# Patient Record
Sex: FEMALE | Race: WHITE | NOT HISPANIC OR LATINO | Employment: UNEMPLOYED | URBAN - METROPOLITAN AREA
[De-identification: names, ages, dates, MRNs, and addresses within clinical notes are randomized per-mention and may not be internally consistent; named-entity substitution may affect disease eponyms.]

---

## 2022-11-01 ENCOUNTER — TELEPHONE (OUTPATIENT)
Dept: GASTROENTEROLOGY | Facility: CLINIC | Age: 52
End: 2022-11-01
Payer: MEDICAID

## 2022-11-01 NOTE — TELEPHONE ENCOUNTER
Called pt, -[phone]   (x3)   no ans  Recording stated number I have dialed has been disconnected or no longer in service      Called - [phone]    recording stated number that is dialed is not a working no.    Pt does not have portal set up

## 2022-11-25 ENCOUNTER — TELEPHONE (OUTPATIENT)
Dept: GASTROENTEROLOGY | Facility: CLINIC | Age: 52
End: 2022-11-25
Payer: MEDICAID

## 2022-11-25 NOTE — TELEPHONE ENCOUNTER
Called pt, - [phone] recording states number you have dialed in no longer in service.  Called (X2) - [phone], recording states voice mail box not set up.

## 2022-12-23 ENCOUNTER — TELEPHONE (OUTPATIENT)
Dept: GASTROENTEROLOGY | Facility: CLINIC | Age: 52
End: 2022-12-23
Payer: MEDICAID

## 2022-12-23 NOTE — TELEPHONE ENCOUNTER
----- Message from Sheryl Caldwell sent at 12/23/2022 11:27 AM CST -----  Contact: Pt live chat  .Type:  Sooner Appointment Request    Caller is requesting a sooner appointment.  Caller declined first available appointment listed below.  Caller will not accept being placed on the waitlist and is requesting a message be sent to doctor.  Name of Caller:Barbara  When is the first available appointment?  Symptoms:referral in   Would the patient rather a call back or a response via [facility]? callback  Best Call Back Number:.[phone] (home)     Additional Information:

## 2023-01-03 ENCOUNTER — TELEPHONE (OUTPATIENT)
Dept: GASTROENTEROLOGY | Facility: CLINIC | Age: 53
End: 2023-01-03
Payer: MEDICAID

## 2023-01-03 NOTE — TELEPHONE ENCOUNTER
----- Message from Gustavo Arzola sent at 1/3/2023  3:17 PM CST -----  Contact: patient  Type:  Patient Call          Who Called: patient         Does the patient know what this is regarding?: Requesting a call back to schedule appt ; please advise           Would the patient rather a call back or a response via [facility]? Call           Best Call Back Number: [phone]             Additional Information: Pt does have a referral

## 2023-01-03 NOTE — TELEPHONE ENCOUNTER
Called and spoke with the patient, patient wanted to know if her insurance was taken here at the Gi clinic, insurance not accepted at this time for new patients per appointment schedule, while attempting to assist the patient, call was disconnected by the patient.                    ----- Message from Gustavo Arzola sent at 1/3/2023  3:17 PM CST -----  Contact: patient  Type:  Patient Call          Who Called: patient         Does the patient know what this is regarding?: Requesting a call back to schedule appt ; please advise           Would the patient rather a call back or a response via [facility]? Call           Best Call Back Number: [phone]             Additional Information: Pt does have a referral

## 2023-01-04 ENCOUNTER — PATIENT MESSAGE (OUTPATIENT)
Dept: GASTROENTEROLOGY | Facility: CLINIC | Age: 53
End: 2023-01-04
Payer: MEDICAID

## 2023-01-04 ENCOUNTER — TELEPHONE (OUTPATIENT)
Dept: GASTROENTEROLOGY | Facility: CLINIC | Age: 53
End: 2023-01-04
Payer: MEDICAID

## 2023-01-04 NOTE — TELEPHONE ENCOUNTER
Discussed with pt that Dr. Gonzales is a consultant that does not accept patients as a primary GI provider.  Discussed that she will send them back to their primary GI provider once their symptoms improved or the plan of care is established.     Pt was told the logistics of the appointment (arrival time, length of visit, total time spent at facility).     Pt was also told that Dr. Gonzales will review their previous workup but may order additional test and perform her own procedures.  Patient agreed and verbalized understanding.      Asked pt to call tech support to see why she is unable to use her [facility].   Ph no provided    Directions to [place] sent to pt portal    VALERY faxed to Dr Salazar requesting   ([phone])  successful @ 10:08a  Surgical report 4/2022  GES report 2022

## 2023-01-12 ENCOUNTER — TELEPHONE (OUTPATIENT)
Dept: GASTROENTEROLOGY | Facility: CLINIC | Age: 53
End: 2023-01-12
Payer: MEDICAID

## 2023-01-12 NOTE — TELEPHONE ENCOUNTER
Received medical records from Dr Salazar:  Gastrectomy surgical report  4/5/22  GES 2/24/22  OV 11/30/22 and 8/24/22

## 2023-01-16 ENCOUNTER — PATIENT MESSAGE (OUTPATIENT)
Dept: GASTROENTEROLOGY | Facility: CLINIC | Age: 53
End: 2023-01-16
Payer: MEDICAID

## 2023-01-18 ENCOUNTER — TELEPHONE (OUTPATIENT)
Dept: GASTROENTEROLOGY | Facility: CLINIC | Age: 53
End: 2023-01-18
Payer: MEDICAID

## 2023-01-18 NOTE — TELEPHONE ENCOUNTER
----- Message from Aakash Lozano MA sent at 1/18/2023 10:11 AM CST -----  Regarding: FW: requesting call back  Contact: self  [phone]    ----- Message -----  From: Sulma Chan  Sent: 1/18/2023   8:57 AM CST  To: [facility] Clinical Staff  Subject: requesting call back                             Pt requesting a appointment pt requesting sting a call back from Lona as she has been communicating on the portal with pt states a referral has been sent I didn't see a referral in the system.

## 2023-01-19 ENCOUNTER — PATIENT MESSAGE (OUTPATIENT)
Dept: GASTROENTEROLOGY | Facility: CLINIC | Age: 53
End: 2023-01-19
Payer: MEDICAID

## 2023-02-03 RX ORDER — RIZATRIPTAN BENZOATE 5 MG/1
TABLET ORAL
COMMUNITY
Start: 2022-12-26

## 2023-02-03 RX ORDER — DICYCLOMINE HYDROCHLORIDE 10 MG/1
10 CAPSULE ORAL
COMMUNITY
Start: 2022-12-31

## 2023-02-03 RX ORDER — PRUCALOPRIDE 2 MG/1
1 TABLET, FILM COATED ORAL
COMMUNITY
Start: 2023-01-17

## 2023-02-03 RX ORDER — PLECANATIDE 3 MG/1
1 TABLET ORAL
COMMUNITY
Start: 2023-01-17

## 2023-02-03 RX ORDER — PANTOPRAZOLE SODIUM 40 MG/1
40 TABLET, DELAYED RELEASE ORAL
COMMUNITY
Start: 2023-01-09

## 2023-02-03 RX ORDER — CYCLOBENZAPRINE HCL 10 MG
10 TABLET ORAL 2 TIMES DAILY PRN
COMMUNITY
Start: 2023-01-06

## 2023-02-03 RX ORDER — ESOMEPRAZOLE MAGNESIUM 40 MG/1
40 CAPSULE, DELAYED RELEASE ORAL EVERY MORNING
COMMUNITY
Start: 2023-01-11

## 2023-02-03 RX ORDER — LEVOCETIRIZINE DIHYDROCHLORIDE 5 MG/1
5 TABLET, FILM COATED ORAL
COMMUNITY
Start: 2022-12-31

## 2023-02-03 RX ORDER — METOCLOPRAMIDE HYDROCHLORIDE 15 MG/.07ML
1 SPRAY NASAL 4 TIMES DAILY
COMMUNITY
Start: 2023-01-06

## 2023-02-03 RX ORDER — ONDANSETRON 8 MG/1
8 TABLET, ORALLY DISINTEGRATING ORAL DAILY PRN
COMMUNITY
Start: 2023-01-13

## 2023-02-03 RX ORDER — AMLODIPINE BESYLATE 10 MG/1
10 TABLET ORAL
COMMUNITY
Start: 2023-01-17

## 2023-02-03 RX ORDER — ATORVASTATIN CALCIUM 10 MG/1
10 TABLET, FILM COATED ORAL
COMMUNITY
Start: 2023-01-13

## 2023-02-03 RX ORDER — LINACLOTIDE 290 UG/1
290 CAPSULE, GELATIN COATED ORAL EVERY MORNING
COMMUNITY
Start: 2022-10-31

## 2023-02-03 RX ORDER — OXYBUTYNIN CHLORIDE 10 MG/1
10 TABLET, EXTENDED RELEASE ORAL
COMMUNITY
Start: 2022-12-13

## 2023-02-03 RX ORDER — PROMETHAZINE HYDROCHLORIDE 25 MG/1
25 TABLET ORAL EVERY 12 HOURS
COMMUNITY
Start: 2023-01-09

## 2023-02-03 RX ORDER — FERROUS SULFATE TAB 325 MG (65 MG ELEMENTAL FE) 325 (65 FE) MG
TAB ORAL
COMMUNITY
Start: 2022-12-08

## 2023-02-06 ENCOUNTER — TELEPHONE (OUTPATIENT)
Dept: GASTROENTEROLOGY | Facility: CLINIC | Age: 53
End: 2023-02-06

## 2023-02-06 ENCOUNTER — PATIENT MESSAGE (OUTPATIENT)
Dept: GASTROENTEROLOGY | Facility: CLINIC | Age: 53
End: 2023-02-06

## 2023-02-06 ENCOUNTER — OFFICE VISIT (OUTPATIENT)
Dept: GASTROENTEROLOGY | Facility: CLINIC | Age: 53
End: 2023-02-06
Payer: MEDICAID

## 2023-02-06 ENCOUNTER — TELEPHONE (OUTPATIENT)
Dept: GASTROENTEROLOGY | Facility: CLINIC | Age: 53
End: 2023-02-06
Payer: MEDICAID

## 2023-02-06 DIAGNOSIS — R13.10 DYSPHAGIA, UNSPECIFIED TYPE: Primary | ICD-10-CM

## 2023-02-06 DIAGNOSIS — K21.9 GASTROESOPHAGEAL REFLUX DISEASE, UNSPECIFIED WHETHER ESOPHAGITIS PRESENT: ICD-10-CM

## 2023-02-06 PROCEDURE — 1160F RVW MEDS BY RX/DR IN RCRD: CPT | Mod: CPTII,95,, | Performed by: INTERNAL MEDICINE

## 2023-02-06 PROCEDURE — 99205 OFFICE O/P NEW HI 60 MIN: CPT | Mod: 95,,, | Performed by: INTERNAL MEDICINE

## 2023-02-06 PROCEDURE — 1160F PR REVIEW ALL MEDS BY PRESCRIBER/CLIN PHARMACIST DOCUMENTED: ICD-10-PCS | Mod: CPTII,95,, | Performed by: INTERNAL MEDICINE

## 2023-02-06 PROCEDURE — 99205 PR OFFICE/OUTPT VISIT, NEW, LEVL V, 60-74 MIN: ICD-10-PCS | Mod: 95,,, | Performed by: INTERNAL MEDICINE

## 2023-02-06 PROCEDURE — 1159F PR MEDICATION LIST DOCUMENTED IN MEDICAL RECORD: ICD-10-PCS | Mod: CPTII,95,, | Performed by: INTERNAL MEDICINE

## 2023-02-06 PROCEDURE — 1159F MED LIST DOCD IN RCRD: CPT | Mod: CPTII,95,, | Performed by: INTERNAL MEDICINE

## 2023-02-06 NOTE — TELEPHONE ENCOUNTER
Called pt, no ans  Lmvm re: need to review AVS instructions  Explained will try to reach out with pt either later today or tomorrow.  Clinic ph no provided

## 2023-02-06 NOTE — PATIENT INSTRUCTIONS
-Complete EGD with EndoFlip    EGD is an endoscopic procedure that allows your doctor to examine your esophagus, stomach and duodenum (part of your small intestine). EGD is an outpatient procedure, meaning you can go home that same day. It takes approximately 30 to 60 minutes to perform.  EndoFLIP is a technology that simultaneously measures the area across the inside of a gastrointestinal organ (for example, the esophagus) and the pressure inside that organ. The ratio of the two measurements is called distensibility (stiffness).    -Complete esophageal manometry   During esophageal manometry, a thin, flexible tube (catheter) that contains pressure sensors is passed through your nose, down your esophagus and into your stomach. Esophageal manometry can be helpful in diagnosing certain disorders that can affect your esophagus.  Esophageal manometry provides information about the movement of food through the esophagus into the stomach. The test measures how well the muscles at the top and bottom of your esophagus (sphincter muscles) open and close, as well as the pressure, speed and pattern of the wave of esophageal muscle contractions that moves food along.    -Complete timed barium esophagogram   Barium esophagram is an X-ray of the esophagus. The patient drinks a liquid that contains barium (a silver-white metallic compound). The liquid coats the esophagus and X-rays are taken.    You can try the following products to help with dry mouth.  This should improve your difficulty swallowing as saliva is needed to lubricate food.    -Biotene (artificial saliva) 3-4 times per day  prior to meals or as needed for dry mouth.  -Xylimelts.  You can find these in local pharmacy, such as [facility] or on internet.  -Lozenge - Marquez's Breezers, ACT dry mouth lozenges sold with cough drops  -Therabreath mouthwash  -ACT mouthwash  -Grether's Pastilles, these are usually found on internet      -Stop reglan and discuss the abnormal tongue  and lip movement and tremors with Dr. Watson   I am concerned that you may have tardive dyskinesia.  treatment with metoclopramide/Reglan can cause tardive dyskinesia, a serious movement disorder that is often irreversible.  Tardive dyskinesia: involuntary movements of the limbs, tongue, face; facial grimacing, twisting of the neck, tongue protrusion, change in speech.     -Drink high protein shakes three times per day. Premiere protein     -Check with Dr. Watson re need for repeat colonoscopy since you are anemic and are having rectal bleeding

## 2023-02-06 NOTE — PROGRESS NOTES
The patient location is:home  The chief complaint leading to consultation is: dysphagia, gerd     Visit type: audiovisual    Face to Face time with patient: 40  62 minutes of total time spent on the encounter, which includes face to face time and non-face to face time preparing to see the patient (eg, review of tests), Obtaining and/or reviewing separately obtained history, Documenting clinical information in the electronic or other health record, Independently interpreting results (not separately reported) and communicating results to the patient/family/caregiver, or Care coordination (not separately reported).         Each patient to whom he or she provides medical services by telemedicine is:  (1) informed of the relationship between the physician and patient and the respective role of any other health care provider with respect to management of the patient; and (2) notified that he or she may decline to receive medical services by telemedicine and may withdraw from such care at any time.    Notes:        [facility] Consultation Note    Reason for Consult:  No chief complaint on file.        PCP:   Erica sylvester   [address] [facility] [address] [facility] GASTRO [facility] /*    Referring MD:  Wood Watson Md  [address] [facility] [address] [facility] [address] [facility] [place] [address]    Surg: Dr. Waldron       HPI:  Barbara Lozano is a 52 y.o. female referred to motility clinic for second opinion regarding the following problems:    Dr. Watson 12/20/2022:  Impression  Dysphagia pharyngeal esophageal phase.  Dilated 54 Polish 09/2022.  Dysmotility on esophagram.  Gastroparesis.  Use Reglan with continued complaints, domperidone with side effects, Botox and dilation with some improvement but still symptoms of nausea, indigestion pain, which have recently increased.  She has had partial gastrectomy by Dr. JOHNSON [facility] are why you in [place] for 04/2022 with improvement in  pain, mild nausea remained initially.  Now with return of nausea.  Food in stomach on EGD.  Using Phenergan and Reglan.  Had to have dilation 12/2022 with surgeon.  Records requested.  Irritable bowel syndrome with predominant constipation.  Chronic idiopathic constipation.  Linzess 290.  Ib Stelara sent in and denied.  Sitz with constipation.  Plan:  Dietary management education guidance and counseling.  Follow-up in 2 weeks after Gastrografin enema.  Will pain to obtain Irritable Bowel Syndrome relative.  Follow-up with surgeon this week.  Promethazine 25.  Obtain all records check on Dr. Gonzales referral.        GERD.    Retrosternal pyrosis: daily   Regurgitation: daily   Onset: many years     MEDS:   Nexium 40mg daily without improvement     Dysphagia.    Problems with solids: few per month    Problems with liquids: no     Choking while eating:  no   Coughing while eating:  no       Location: upper chest   Onset of symptoms: 6 months     History of food impactions requiring ED visit:no   History of allergies/eczema. Yes    MEDs:   Narcotics:no   Marijuana:no   Muscle relaxant: few per month- flexeril recently stopped    Oxybutynin for bladder x2 months   Bentyl        Dry mouth   Oxybutynin for bladder x2 months     Multiple dilations 54 Fr w temporary improvement (1 month) than comes back     Chest pain: None     Nausea: daily   Early satiety: daily    Vomiting: daily     DIET:   Able to eat very small meals.   Mostly soups, + salads, no pork      MEDs: reglan -> Gimotil  Motegrity   Zofran   Phenergan     Abd pain   All over.  Constant     Bloating     Constipation and diarrhea   Linsess 290   Trulance   Motegrity      BRBPR occasional.  Sm amount w starining     Anemia   On iron   Colonoscopy 2016, repeat due 2026     Weight loss   149lb   160lb 4/2022  No shakes     Denies melena, weight loss.       Total visit time was 62   minutes, more than 50% of which was spent in face-to-face counseling with patient  regarding symptoms, diagnostic results, prognosis, risks and benefits of treatment options, instructions for management, importance of compliance with chosen treatment options and coping strategies.      Previous Studies:   Gastrografin per rectum 12/21/2022 stool burden limits evaluation for polyps.  No large constricting mass.  Few scattered diverticuli.  EGD 12/15/2022:  Unable to read some parts of the report.  Ruegge him was slightly narrow and mild stricturing of the GJ balloon dilation.  Esophagram 10/26/2022: Tertiary contractions are visualized within the esophagus.  There was esophageal dysmotility with pulling on contrast in lower esophagus.  Contrast ultimately extends through the lower esophageal sphincter into postsurgical stomach  Barium swallow 10/26/2022: Tertiary contractions are visualized within the esophagus.  There was esophageal dysmotility with pooling of contrast in the lower esophagus.  Contrast ultimately extends through the lower esophageal sphincter and to postsurgical stomach.  EGD 09/02/2022:  Normal esophagus.  Dilation 54 [place].  Erythema in the antrum and stomach compatible with gastritis (-).  Previous surgery in antrum Billroth 1 anastomosis.  Food in the fundus.  Normal mucosa in 3rd part of the duodenum (-).  Gastric emptying study 02/24/2022:  Very limited emptying whatsoever by 90 minute on account about 2-1/2 gastric emptying time.  Impression gastroparesis or gastric outlet obstruction.  EGD 01/11/2022:  Esophageal candidiasis (+).  Stricture at GE junction.  Dilation 54 [place].  Erythema in the antrum and stomach compatible with acute gastritis (chronic gastritis, parietal cell hyperplasia).  Hiatal hernia small paraesophageal.  Normal mucosa in duodenum (d-/d-).  EGD 10/08/2021:  Severe gastroparesis status post successful Botox and by loose dilation 18 mm of pylorus.  Acute gastritis.  Status post biopsies (-)  CT abdomen 04/12/2021:  Small hiatal hernia.  Small gastric  diverticulum.  Few colonic diverticula without evidence of diverticulitis.  EGD 2021: Grade 1 esophagitis.  Dilation 54 Nigerian.  Ulcer in the antrum (-).  Erythema in the antrum and stomach (-).  Normal duodenum (-).  Gastric emptying study 2016: Markedly abnormal gastric emptying study.  At 226 minutes 40% residual.  Esophagram 10/19/2016: Normal S  Colonoscopy 2016:  Normal mucosa in the ascending colon transverse colon sigmoid colon ().  Polyp in sigmoid ().  Internal hemorrhoids.    Relevant Surgical History:    2022:  Dr. Waldron.  Robotic assisted near total gastrectomy.  Path:  Minute focus of active gastritis.  Focal mild hypertrophy of the mucous colitis propria.  Negativ for dysplasia metaplasia or malignancy.      ROS:  ROS     Complete ROS negative except as above    Medical History:   Past Medical History:   Diagnosis Date    Acute gastric ulcer     chronic Constipation     Diaphragmatic hernia     Diarrhea     Diverticulum of esophagus     Dysphagia     Esophagitis, unspecified without bleeding     Gastric diverticulum     Gastritis     Gastroparesis     GERD (gastroesophageal reflux disease)     Hemorrhoids     Irritable bowel syndrome     Migraines     Osteoporosis         Surgical History:   Past Surgical History:   Procedure Laterality Date    APPENDECTOMY      CHOLECYSTECTOMY      EGD, WITH BALLOON DILATION  12/15/2022    20mm    HYSTERECTOMY      robotic assisted near total gastrectomy  2022        Family History:   Family History   Problem Relation Age of Onset    Hypertension Mother     Diabetes Mother     Arthritis Mother     Heart disease Father         MI  ~ 45 yrs old    Other Maternal Grandmother         had colostomy bag ? how many yr. had when she     Esophageal cancer Neg Hx     Stomach cancer Neg Hx     Colon cancer Neg Hx     Colon polyps Neg Hx     Pancreatic cancer Neg Hx     Liver cancer Neg Hx     Celiac disease Neg Hx     Crohn's disease  Neg Hx     Irritable bowel syndrome Neg Hx     Ulcerative colitis Neg Hx     Rectal cancer Neg Hx         Social History:   Social History     Socioeconomic History    Marital status: Single   Tobacco Use    Smoking status: Never    Smokeless tobacco: Never   Substance and Sexual Activity    Alcohol use: No    Drug use: Never    Sexual activity: Yes     Partners: Male        Review of patient's allergies indicates:   Allergen Reactions    Adhesive tape-silicones Blisters     Blisters -severe       Current Outpatient Medications   Medication Sig Dispense Refill    amLODIPine (NORVASC) 10 MG tablet Take 10 mg by mouth.      atorvastatin (LIPITOR) 10 MG tablet Take 10 mg by mouth.      cyclobenzaprine (FLEXERIL) 10 MG tablet Take 10 mg by mouth 2 (two) times daily as needed.      dicyclomine (BENTYL) 10 MG capsule Take 10 mg by mouth.      esomeprazole (NEXIUM) 40 MG capsule Take 40 mg by mouth every morning.      FEROSUL 325 mg (65 mg iron) Tab tablet Take by mouth.      levocetirizine (XYZAL) 5 MG tablet Take 5 mg by mouth.      LINZESS 290 mcg Cap capsule Take 290 mcg by mouth every morning.      metoclopramide HCl (REGLAN) 10 MG tablet Take 1 tablet (10 mg total) by mouth every 6 (six) hours as needed (Nausea). 30 tablet 0    MOTEGRITY 2 mg Tab Take 1 tablet by mouth.      ondansetron (ZOFRAN) 4 MG tablet Take 1 tablet (4 mg total) by mouth every 8 (eight) hours as needed for Nausea. 12 tablet 0    ondansetron (ZOFRAN-ODT) 8 MG TbDL 8 mg daily as needed.      oxybutynin (DITROPAN-XL) 10 MG 24 hr tablet Take 10 mg by mouth.      pantoprazole (PROTONIX) 40 MG tablet Take 40 mg by mouth.      promethazine (PHENERGAN) 25 MG tablet Take 25 mg by mouth every 12 (twelve) hours.      rizatriptan (MAXALT) 5 MG tablet SMARTSI Tablet(s) By Mouth 1-2 Times Daily      TRULANCE 3 mg Tab Take 1 tablet by mouth.      GIMOTI 15 mg/spray SprP 1 spray by Each Nostril route 4 (four) times daily.       No current  facility-administered medications for this visit.        Objective Findings:  Vital Signs:  There were no vitals taken for this visit.  There is no height or weight on file to calculate BMI.    Physical Exam:  Physical Exam:limited due to video visit  General appearance: alert, cooperative, no distress  HENT: Normocephalic, atraumatic, neck symmetrical, no nasal discharge  Eyes: conjunctivae/corneas clear,  EOM's intact  Extremities: visible extremities symmetric; no clubbing, cyanosis, or edema  Integument: visible Skin color, texture, turgor normal; no rashes; hair distrubution normal  Neurologic: Alert and oriented X 3,  Psychiatric: no pressured speech; normal affect; no evidence of impaired cognition      Labs:   Reviewed in Epic/record      Assessment and Plan:  Barbara Lozano is a 52 y.o. female with referred to [facility] Clinic for 2nd opinion regarding following problems:    GERD.    Paraesophageal hernia   On Nexium 40mg daily without improvement     Dysphagia to solids  Esophageal diverticulum per PMH  Muscle relaxant: few per month- flexeril recently stopped    Oxybutynin for bladder x2 months   Bentyl  Esophagram 10/26/2022: Tertiary contractions are visualized within the esophagus.  There was esophageal dysmotility with pulling on contrast in lower esophagus.  Contrast ultimately extends through the lower esophageal sphincter into postsurgical stomach  Multiple dilations 54 Fr w temporary improvement  -EGD w EOE, FLIP (s/p sup total gastrectomy (?barrett 1), r/o esophageal vs gastric TIC, r/o paraesophageal hernia )  -TBS  -Manometry     Dry mouth   Oxybutynin for bladder x2 months   -Biotene     Nausea. Early satiety. Vomiting  S/p subtotal gastrectomy (?Barrett I) 4/2022 w Dr. Waldron   -Elizabeth  diet   -Stop reglan and discuss ? SE w primary GI    -Motegrity   -Zofran   -Phenergan   -Def to primary GI     Abd pain   Bloating   -Def to primary GI     Constipation and diarrhea   -Linsess 290    -Trulance   -Motegrity   -Def to primary GI      BRBPR occasional.  Sm amount w starining   Anemia.  On iron   Colonoscopy 2016  -Disc colonoscopy w ref GI   -Def to primary GI     Weight loss   -Shakes TID   -Def to primary GI     Follow up in about 3 months (around 5/6/2023).    1. Dysphagia, unspecified type    2. Gastroesophageal reflux disease, unspecified whether esophagitis present          Order summary:       Discussed with PT that I act as a consult service and do not accept patients to be their primary GI provider. Discussed that the goal of our visits is to address relevant motility problems while deferring other GI problems as well as screening and surveillance to his/her primary GI provider.   Discussed that he/she needs to continue to follow with his local primary GI provider.  Discussed that we will complete his/her workup, clarify diagnosis and attempt to optimize his/her symptoms with intention of him/her returning to referring GI provider for long term GI care.   Pt verbalized understanding.        Thank you so much for allowing me to participate in the care of Barbara Gonzales MD      This note was created using voice recognition software, and may contain some unrecognized transcriptional errors.

## 2023-02-06 NOTE — TELEPHONE ENCOUNTER
MOTILITY CLINIC PROCEDURE ORDERS    CLEARANCE FOR PROCEDURES:  [x] Not needed   [] Cardiology   [] Pulmonary  [] PCP    PROCEDURES  [] EGD   [] Colonoscopy   [x] EGD with EndoFlip   [x] Esophageal manometry with impedance - dysphagia   [x] Esophageal manometry with impedance - rumination  [] Esophageal manometry with impedance - belching   [] EGD with BRAVO 48 hours   [] EGD with BRAVO 96 hours   [] pH Impedance 24 hours   [] Anorectal manometry   [] Rectal Ultrasound     Does manometry need to be placed endoscopically:   [] Yes    FLOOR:  [] 4th Floor   [x] 2nd Floor    Reason for 2nd Floor:   [x] Gastroparesis   [] End stage achalasia  [] Pre lung transplant   [] Pre hear transplant   [] Pulmonary HTN (PAP>50 or on meds)   [] Severe pulmonary Disease   [] Complex medical problems   [] BMI>50  [] History of anesthesia issues  [] Other:    PREP  [] Standard Prep  [] Severe Constipation Prep (Low residue diet 7 days.  1.5 prep the day prior, 0.5 prep the day of procedure)  [] Full liquid diet 5 days   [x] Full liquid diet 3 days   [] Full liquid diet 2 days   [] Full liquid diet 1 day   [] Other:     MEDICATIONS    pH Studies  [] ON PPI/H2 Blocker   [] OFF PPI/H2 Blocker     Metal allergy (stainless steal w chromium, nickel, copper, cobalt and iron).:   []  Yes    Pacemaker/defibrillator:  [] Yes    Motility Studies (esophageal manometry/anorectal manometry)  Hold narcotics x 1 days if able   Hold TCA x 1 days if able  Propofol/lidocaine only during sedation.  Discuss with Dr. Gonzales if additional sedation needed.   Hold baclofen for thee days (at least one day) if able   Hold muscle relaxants x 1 day if able   Hold bentyl     Anticoagulation/antiplt agents:   []  Yes    ORDER OF TESTING:  Day 1: manometry/TBS   Day 2: EGd/FLIP     [x] No special requirements - pt lives locally     SCHEDULING PRIORITY  [] Urgent  (<7 days)  [] Lung Transplant Workup  [] Priority (<30 days)  [] Routine 1 (schedule ASAP)  [x] Routine 2  (next available, < 3 months)   [] Routine 3 (ok if > 3 months)      URGENCY   [] Medically Urgent   [x] Medically NOT Urgent      DIAGNOSIS   [x] Dysphagia   [] EoE  [] GERD   [] Nausea  [] Nausea/vomiting   [] Abd pain   [] Weight loss  [] Diarrhea  [] Melena  [] Hematochezia    [] CRC screening   [] Colon polyps  [] Other:       PHYSICIAN  []  Ok with Dr. Locke   []  Ok with any GI MD

## 2023-02-07 ENCOUNTER — TELEPHONE (OUTPATIENT)
Dept: GASTROENTEROLOGY | Facility: CLINIC | Age: 53
End: 2023-02-07
Payer: MEDICAID

## 2023-02-07 ENCOUNTER — PATIENT MESSAGE (OUTPATIENT)
Dept: GASTROENTEROLOGY | Facility: CLINIC | Age: 53
End: 2023-02-07
Payer: MEDICAID

## 2023-02-07 DIAGNOSIS — R13.10 DYSPHAGIA, UNSPECIFIED TYPE: Primary | ICD-10-CM

## 2023-02-08 ENCOUNTER — TELEPHONE (OUTPATIENT)
Dept: GASTROENTEROLOGY | Facility: CLINIC | Age: 53
End: 2023-02-08
Payer: MEDICAID

## 2023-02-08 NOTE — TELEPHONE ENCOUNTER
Called pt to confirmed TBS scheduled date works for her.  Stated will hear back from her daughter today or tomorrow, if this date does not work she will let me know.

## 2023-02-20 ENCOUNTER — TELEPHONE (OUTPATIENT)
Dept: HEMATOLOGY/ONCOLOGY | Facility: CLINIC | Age: 53
End: 2023-02-20
Payer: MEDICAID

## 2023-02-20 NOTE — NURSING
Spoke with patient to schedule appointment on 2/28.  Patient records are being faxed from [facility].  Patient verbalized understanding of date, time and location.

## 2023-02-27 PROBLEM — Z90.3 STATUS POST GASTRECTOMY: Status: ACTIVE | Noted: 2023-02-27

## 2023-02-27 PROBLEM — D50.8 IRON DEFICIENCY ANEMIA SECONDARY TO INADEQUATE DIETARY IRON INTAKE: Status: ACTIVE | Noted: 2023-02-27

## 2023-02-27 PROBLEM — D72.829 LEUKOCYTOSIS: Status: ACTIVE | Noted: 2023-02-27

## 2023-02-27 PROBLEM — D75.839 THROMBOCYTOSIS: Status: ACTIVE | Noted: 2023-02-27

## 2023-02-27 PROBLEM — E78.5 DYSLIPIDEMIA: Status: ACTIVE | Noted: 2023-02-27

## 2023-02-27 NOTE — PROGRESS NOTES
Subjective:       Name: Barbara Lozano  : 1970  MRN: [id number]    Chief Complaint   Patient presents with    Elevated WBC     New Patient          HPI: Barbara Lozano is a 52 y.o. female presents for evaluation of Elevated WBC (New Patient)    The patient had blood workup done on 2023 that showed a white blood cell count of 14 hemoglobin of 11.3 MCV of 84.8 and platelet count of 534.  Her absolute neutrophil count was 10.83.  Her iron studies showed an iron of 41 % saturation of 11.  Her TSH and free T4 were normal.  CMP was normal.   Her repeat blood workup done on 2023 showed a platelet count of 414 with an absolute neutrophil count of 10.08 and a white blood cell count of 13.5.  The hemoglobin was 11.  The peripheral smear review showed the presence of a normal chromic normocytic anemia, neutrophilia and mild thrombocytosis.  Causes of the anemia could be blood loss hemolysis hemoglobinopathy marrow replacement and early or compensated iron-deficiency.  The platelets were mildly increased but are morphologically unremarkable.  The white blood cell show a mild increase in neutrophils but they appear mature and was segmented.  Occult blood in the stools came back negative.     She had blood workup done on 2022 showed a white count of 17.8 a hemoglobin of 10 hematocrit 28.9 MCV of 91.7 and a platelet count of 168.  Her blood workup done on December 15, 2021 showed a white count of 8.2 hemoglobin of 11 MCV of 88.4 and platelet count of 387.        She is s/p hysterectomy. She eats red meat intermittently. She had an intermittent microscopic hematuria. She experienced swelling and pain in the knees and the calf. She went to the ER at [facility]. She had a CBC that showed a normal wbc and a Hgb 10.1 and plat 415. CMP was normal except for an elevated ALKP. She started taking oral iron once a day for the last 2 months with no improvement of her iron deficiency  anemia.    She is not a smoker. She used to drink but quit 8 years ago. She is s/p gastric resection due treat gastroparesis in 2022.The patient last colonoscopy was done on 2023.  It showed the presence of diverticulosis.    She denies any family history of malignancies.    Oncology History    No history exists.        Past Medical History:   Diagnosis Date    Acute gastric ulcer     chronic Constipation     Diaphragmatic hernia     Diarrhea     Diverticulum of esophagus     Dysphagia     Esophagitis, unspecified without bleeding     Gastric diverticulum     Gastritis     Gastroparesis     GERD (gastroesophageal reflux disease)     Hemorrhoids     Irritable bowel syndrome     Migraines     Osteoporosis        Past Surgical History:   Procedure Laterality Date    APPENDECTOMY      CHOLECYSTECTOMY      EGD, WITH BALLOON DILATION  12/15/2022    20mm    HYSTERECTOMY      robotic assisted near total gastrectomy  2022       Family History   Problem Relation Age of Onset    Hypertension Mother     Diabetes Mother     Arthritis Mother     Heart disease Father         MI  ~ 45 yrs old    Other Maternal Grandmother         had colostomy bag ? how many yr. had when she     Esophageal cancer Neg Hx     Stomach cancer Neg Hx     Colon cancer Neg Hx     Colon polyps Neg Hx     Pancreatic cancer Neg Hx     Liver cancer Neg Hx     Celiac disease Neg Hx     Crohn's disease Neg Hx     Irritable bowel syndrome Neg Hx     Ulcerative colitis Neg Hx     Rectal cancer Neg Hx        Social History     Socioeconomic History    Marital status: Single   Tobacco Use    Smoking status: Never    Smokeless tobacco: Never   Substance and Sexual Activity    Alcohol use: No    Drug use: Never    Sexual activity: Yes     Partners: Male   Social History Narrative    ** Merged History Encounter **            Review of patient's allergies indicates:   Allergen Reactions    Adhesive tape-silicones Blisters      "Blisters -severe       Review of Systems   Constitutional:  Positive for fatigue.   HENT:  Negative for hearing loss, mouth dryness, tinnitus and trouble swallowing.    Respiratory:  Positive for shortness of breath (at excertion).    Cardiovascular:  Positive for leg swelling.   Gastrointestinal:  Negative for rectal pain and vomiting.   Genitourinary:  Positive for hematuria.   Musculoskeletal:  Positive for arthralgias (knee pain). Negative for gait problem, myalgias and joint deformity. Joint swelling: bilateral knee.  Integumentary:  Negative for pallor, rash and wound.   Neurological:  Positive for dizziness and weakness.   Psychiatric/Behavioral:  Negative for decreased concentration. The patient is nervous/anxious.           Objective:     Vitals:    02/28/23 0913   BP: 125/81   BP Location: Left arm   Patient Position: Sitting   BP Method: Medium (Automatic)   Pulse: 81   Resp: 16   Temp: 97.6 °F (36.4 °C)   TempSrc: Temporal   SpO2: 96%   Weight: 73.8 kg (162 lb 11.2 oz)   Height: 5' 2" (1.575 m)        Physical Exam  Vitals reviewed.   Constitutional:       Appearance: Normal appearance.   HENT:      Head: Normocephalic and atraumatic.      Nose: Nose normal.   Eyes:      General: No scleral icterus.     Extraocular Movements: Extraocular movements intact.      Conjunctiva/sclera: Conjunctivae normal.      Pupils: Pupils are equal, round, and reactive to light.   Cardiovascular:      Rate and Rhythm: Normal rate and regular rhythm.      Pulses: Normal pulses.      Heart sounds: Normal heart sounds.   Pulmonary:      Effort: Pulmonary effort is normal.      Breath sounds: Normal breath sounds.   Abdominal:      General: Bowel sounds are normal. There is no distension (diffuse).   Musculoskeletal:         General: Tenderness present. No swelling.      Cervical back: No tenderness.      Right lower leg: Edema present.      Left lower leg: Edema present.   Lymphadenopathy:      Cervical: No cervical " adenopathy.   Skin:     General: Skin is warm.      Findings: No rash.   Neurological:      General: No focal deficit present.      Mental Status: She is alert and oriented to person, place, and time.      Cranial Nerves: No cranial nerve deficit.      Motor: No weakness.   Psychiatric:         Mood and Affect: Mood normal.         Behavior: Behavior normal.              Current Outpatient Medications on File Prior to Visit   Medication Sig    amLODIPine (NORVASC) 10 MG tablet Take 10 mg by mouth.    atorvastatin (LIPITOR) 10 MG tablet Take 10 mg by mouth.    cyclobenzaprine (FLEXERIL) 10 MG tablet Take 10 mg by mouth 2 (two) times daily as needed.    dexlansoprazole (DEXILANT) 60 mg capsule Take 1 capsule by mouth every morning.    dicyclomine (BENTYL) 10 MG capsule Take 10 mg by mouth.    dicyclomine HCl (DICYCLOMINE ORAL) dicyclomine Take No date recorded No form recorded No frequency recorded No route recorded No set duration recorded No set duration amount recorded active No dosage strength recorded No dosage strength units of measure recorded    esomeprazole (NEXIUM) 40 MG capsule Take 40 mg by mouth every morning.    FEROSUL 325 mg (65 mg iron) Tab tablet Take by mouth.    GIMOTI 15 mg/spray SprP 1 spray by Each Nostril route 4 (four) times daily.    hydrOXYzine pamoate (VISTARIL) 50 MG Cap hydroxyzine pamoate 50 mg capsule   TAKE 1 TO 2 CAPSULES BY MOUTH AT BEDTIME AS NEEDED FOR INSOMNIA    levocetirizine (XYZAL) 5 MG tablet Take 5 mg by mouth.    LINZESS 290 mcg Cap capsule Take 290 mcg by mouth every morning.    methocarbamoL (ROBAXIN) 500 MG Tab Take 500-1,000 mg by mouth.    metoclopramide HCl (REGLAN) 10 MG tablet Take 1 tablet (10 mg total) by mouth every 6 (six) hours as needed (Nausea).    MOTEGRITY 2 mg Tab Take 1 tablet by mouth.    OMEPRAZOLE ORAL omeprazole Take No date recorded No form recorded No frequency recorded No route recorded No set duration recorded No set duration amount recorded  active No dosage strength recorded No dosage strength units of measure recorded    ondansetron (ZOFRAN) 4 MG tablet Take 1 tablet (4 mg total) by mouth every 8 (eight) hours as needed for Nausea.    ondansetron (ZOFRAN-ODT) 8 MG TbDL 8 mg daily as needed.    oxybutynin (DITROPAN-XL) 10 MG 24 hr tablet Take 10 mg by mouth.    pantoprazole (PROTONIX) 40 MG tablet Take 40 mg by mouth.    promethazine (PHENERGAN) 25 MG tablet Take 25 mg by mouth every 12 (twelve) hours.    rizatriptan (MAXALT) 5 MG tablet SMARTSI Tablet(s) By Mouth 1-2 Times Daily    rOPINIRole (REQUIP) 0.25 MG tablet Take 0.25 mg by mouth.    TRULANCE 3 mg Tab Take 1 tablet by mouth.     No current facility-administered medications on file prior to visit.       CBC:  Lab Results   Component Value Date    WBC 10.73 2021    HGB 11.9 (L) 2021    HCT 35.6 (L) 2021    MCV 85 2021     2021         CMP:  Sodium   Date Value Ref Range Status   2021 142 136 - 145 mmol/L Final     Potassium   Date Value Ref Range Status   2021 3.8 3.5 - 5.1 mmol/L Final     Chloride   Date Value Ref Range Status   2021 107 95 - 110 mmol/L Final     CO2   Date Value Ref Range Status   2021 27 22 - 31 mmol/L Final     Glucose   Date Value Ref Range Status   2021 102 70 - 110 mg/dL Final     Comment:     The ADA recommends the following guidelines for fasting glucose:    Normal:       less than 100 mg/dL    Prediabetes:  100 mg/dL to 125 mg/dL    Diabetes:     126 mg/dL or higher       BUN   Date Value Ref Range Status   2021 14 7 - 18 mg/dL Final     Creatinine   Date Value Ref Range Status   2021 0.70 0.50 - 1.40 mg/dL Final     Calcium   Date Value Ref Range Status   2021 9.5 8.4 - 10.2 mg/dL Final     Total Protein   Date Value Ref Range Status   2021 7.5 6.0 - 8.4 g/dL Final     Albumin   Date Value Ref Range Status   2021 4.6 3.5 - 5.2 g/dL Final     Total Bilirubin   Date  Value Ref Range Status   04/12/2021 0.4 0.2 - 1.3 mg/dL Final     Alkaline Phosphatase   Date Value Ref Range Status   04/12/2021 119 38 - 145 U/L Final     AST ([place])   Date Value Ref Range Status   11/11/2015 22 14 - 36 U/L Final     AST   Date Value Ref Range Status   04/12/2021 26 14 - 36 U/L Final     ALT   Date Value Ref Range Status   04/12/2021 14 0 - 35 U/L Final     Anion Gap   Date Value Ref Range Status   04/12/2021 8 8 - 16 mmol/L Final     eGFR if    Date Value Ref Range Status   04/12/2021 >60 >60 mL/min/1.73 m^2 Final     eGFR if non    Date Value Ref Range Status   04/12/2021 >60 >60 mL/min/1.73 m^2 Final     Comment:     Calculation used to obtain the estimated glomerular filtration  rate (eGFR) is the CKD-EPI equation.          US Lower Extremity Veins Bilateral  Narrative: EXAMINATION:  US LOWER EXTREMITY VEINS BILATERAL    CLINICAL HISTORY:  Pain in leg, unspecified    TECHNIQUE:  Duplex and color flow Doppler and dynamic compression was performed of the bilateral lower extremity veins was performed.    COMPARISON:  None.    FINDINGS:  Right thigh veins: The common femoral, femoral, popliteal, upper greater saphenous, and deep femoral veins are patent and free of thrombus. The veins are normally compressible and have normal phasic flow and augmentation response.    Right calf veins: The visualized calf veins are patent.    Left thigh veins: The common femoral, femoral, popliteal, upper greater saphenous, and deep femoral veins are patent and free of thrombus. The veins are normally compressible and have normal phasic flow and augmentation response.    Left calf veins: The visualized calf veins are patent.    Miscellaneous: None  Impression: No evidence of deep venous thrombosis in either lower extremity.    Electronically signed by: Hu Gutierrez  Date:    03/01/2023  Time:    11:53       ECOG SCORE    1 - Restricted in strenuous activity-ambulatory and  able to carry out work of a light nature              Assessment/Plan:         1- Iron deficiency anemia due to malabsorption:  -I reviewed independently the patient laboratory and radiologic findings her medical records from [facility] also were reviewed.  I discussed her blood workup going back to 2021 with the patient was anemic with persistent leukocytosis and thrombocytosis.  Her iron studies are favoring iron-deficiency anemia.  Her % saturation is low.  Even though she has been taking oral iron for the last 2 months most likely she is not able to absorb the oral iron due to her recent gastrectomy and her oral intake of PPI.  The patient will be scheduled to receive IV Venofer at a dose of 300 mg weekly for 4 doses.  Before starting her treatment she will have blood workup drawn today for iron studies vitamin B12 ESR LDH.      2- Leukocytosis and thrombocytosis  -most likely related to her iron-deficiency anemia less likely due to an underlying myeloproliferative disorder.  The patient counts will be monitored in 3 months for now with repeat CBC to assess the resolution of this abnormality after the correction of her iron-deficiency anemia.    3-Status post gastrectomy:  -most likely the patient has malabsorption of multiple micro element.  She will have blood workup drawn today as well for the vitamin B12.  -in view of her abdominal pain at the site of the surgery the patient was advised to follow up with her surgeon.      4-Joint pain and swelling  -she will be scheduled to undergo a bilateral lower extremity duplex to rule out DVT.    -she will have blood workup drawn today for an ROMEL.    5-Overweight BMI29.7  -she was advised to exercise maintain healthy lifestyle and to lose weight.           Med Onc Chart Routing      Follow up with physician 2 weeks. to discuss the results of her blood work-up and bilateral lower extremity duplex to be done today. She will be scheduled to have 4 doses of IV  venofer at a dose of 300 mg IV weekly   Follow up with ELDA    Infusion scheduling note    Injection scheduling note    Labs Ferritin, iron and TIBC, urinalysis, vitamin B12 and LDH   Scheduling:  Preferred lab:  Lab interval:  ESR, ROMEL   Imaging    Pharmacy appointment    Other referrals            Plan was discussed with the patient at length, and she verbalized understanding. Barbara was given an opportunity to ask questions that were answered to her satisfaction, and she was advised to call in the interval if any problems or questions arise.  Signed:  Laverne Otero MD   Hematology and Oncology  [facility] - HEMATOLOGY ONCOLOGY  [facility], [place]

## 2023-02-28 ENCOUNTER — LAB VISIT (OUTPATIENT)
Dept: LAB | Facility: HOSPITAL | Age: 53
End: 2023-02-28
Attending: INTERNAL MEDICINE
Payer: MEDICAID

## 2023-02-28 ENCOUNTER — OFFICE VISIT (OUTPATIENT)
Dept: HEMATOLOGY/ONCOLOGY | Facility: CLINIC | Age: 53
End: 2023-02-28
Payer: MEDICAID

## 2023-02-28 VITALS
RESPIRATION RATE: 16 BRPM | WEIGHT: 162.69 LBS | TEMPERATURE: 98 F | HEIGHT: 62 IN | BODY MASS INDEX: 29.94 KG/M2 | HEART RATE: 81 BPM | SYSTOLIC BLOOD PRESSURE: 125 MMHG | DIASTOLIC BLOOD PRESSURE: 81 MMHG | OXYGEN SATURATION: 96 %

## 2023-02-28 DIAGNOSIS — M79.89 PAIN AND SWELLING OF LOWER EXTREMITY, UNSPECIFIED LATERALITY: ICD-10-CM

## 2023-02-28 DIAGNOSIS — D50.8 IRON DEFICIENCY ANEMIA SECONDARY TO INADEQUATE DIETARY IRON INTAKE: ICD-10-CM

## 2023-02-28 DIAGNOSIS — M25.562 ARTHRALGIA OF BOTH KNEES: ICD-10-CM

## 2023-02-28 DIAGNOSIS — Z90.3 STATUS POST GASTRECTOMY: ICD-10-CM

## 2023-02-28 DIAGNOSIS — E78.5 DYSLIPIDEMIA: ICD-10-CM

## 2023-02-28 DIAGNOSIS — I10 PRIMARY HYPERTENSION: ICD-10-CM

## 2023-02-28 DIAGNOSIS — D72.829 LEUKOCYTOSIS, UNSPECIFIED TYPE: ICD-10-CM

## 2023-02-28 DIAGNOSIS — D75.839 THROMBOCYTOSIS: ICD-10-CM

## 2023-02-28 DIAGNOSIS — M25.561 ARTHRALGIA OF BOTH KNEES: ICD-10-CM

## 2023-02-28 DIAGNOSIS — E66.3 OVERWEIGHT (BMI 25.0-29.9): ICD-10-CM

## 2023-02-28 DIAGNOSIS — D50.8 IRON DEFICIENCY ANEMIA SECONDARY TO INADEQUATE DIETARY IRON INTAKE: Primary | ICD-10-CM

## 2023-02-28 DIAGNOSIS — M79.606 PAIN AND SWELLING OF LOWER EXTREMITY, UNSPECIFIED LATERALITY: ICD-10-CM

## 2023-02-28 LAB
ERYTHROCYTE [SEDIMENTATION RATE] IN BLOOD BY PHOTOMETRIC METHOD: 16 MM/HR (ref 0–36)
FERRITIN SERPL-MCNC: 17 NG/ML (ref 20–300)
IRON SERPL-MCNC: 38 UG/DL (ref 30–160)
LDH SERPL L TO P-CCNC: 229 U/L (ref 110–260)
SATURATED IRON: 9 % (ref 20–50)
TOTAL IRON BINDING CAPACITY: 440 UG/DL (ref 250–450)
TRANSFERRIN SERPL-MCNC: 297 MG/DL (ref 200–375)
VIT B12 SERPL-MCNC: 342 PG/ML (ref 210–950)

## 2023-02-28 PROCEDURE — 99999 PR PBB SHADOW E&M-EST. PATIENT-LVL IV: ICD-10-PCS | Mod: PBBFAC,,, | Performed by: INTERNAL MEDICINE

## 2023-02-28 PROCEDURE — 3074F PR MOST RECENT SYSTOLIC BLOOD PRESSURE < 130 MM HG: ICD-10-PCS | Mod: CPTII,,, | Performed by: INTERNAL MEDICINE

## 2023-02-28 PROCEDURE — 1160F PR REVIEW ALL MEDS BY PRESCRIBER/CLIN PHARMACIST DOCUMENTED: ICD-10-PCS | Mod: CPTII,,, | Performed by: INTERNAL MEDICINE

## 2023-02-28 PROCEDURE — 86038 ANTINUCLEAR ANTIBODIES: CPT | Performed by: INTERNAL MEDICINE

## 2023-02-28 PROCEDURE — 36415 COLL VENOUS BLD VENIPUNCTURE: CPT | Mod: PN | Performed by: INTERNAL MEDICINE

## 2023-02-28 PROCEDURE — 99999 PR PBB SHADOW E&M-EST. PATIENT-LVL IV: CPT | Mod: PBBFAC,,, | Performed by: INTERNAL MEDICINE

## 2023-02-28 PROCEDURE — 1159F PR MEDICATION LIST DOCUMENTED IN MEDICAL RECORD: ICD-10-PCS | Mod: CPTII,,, | Performed by: INTERNAL MEDICINE

## 2023-02-28 PROCEDURE — 99205 OFFICE O/P NEW HI 60 MIN: CPT | Mod: S$PBB,,, | Performed by: INTERNAL MEDICINE

## 2023-02-28 PROCEDURE — 3079F PR MOST RECENT DIASTOLIC BLOOD PRESSURE 80-89 MM HG: ICD-10-PCS | Mod: CPTII,,, | Performed by: INTERNAL MEDICINE

## 2023-02-28 PROCEDURE — 99214 OFFICE O/P EST MOD 30 MIN: CPT | Mod: PBBFAC,PN | Performed by: INTERNAL MEDICINE

## 2023-02-28 PROCEDURE — 99205 PR OFFICE/OUTPT VISIT, NEW, LEVL V, 60-74 MIN: ICD-10-PCS | Mod: S$PBB,,, | Performed by: INTERNAL MEDICINE

## 2023-02-28 PROCEDURE — 1159F MED LIST DOCD IN RCRD: CPT | Mod: CPTII,,, | Performed by: INTERNAL MEDICINE

## 2023-02-28 PROCEDURE — 85652 RBC SED RATE AUTOMATED: CPT | Performed by: INTERNAL MEDICINE

## 2023-02-28 PROCEDURE — 3074F SYST BP LT 130 MM HG: CPT | Mod: CPTII,,, | Performed by: INTERNAL MEDICINE

## 2023-02-28 PROCEDURE — 3008F PR BODY MASS INDEX (BMI) DOCUMENTED: ICD-10-PCS | Mod: CPTII,,, | Performed by: INTERNAL MEDICINE

## 2023-02-28 PROCEDURE — 3079F DIAST BP 80-89 MM HG: CPT | Mod: CPTII,,, | Performed by: INTERNAL MEDICINE

## 2023-02-28 PROCEDURE — 83615 LACTATE (LD) (LDH) ENZYME: CPT | Mod: PN | Performed by: INTERNAL MEDICINE

## 2023-02-28 PROCEDURE — 82607 VITAMIN B-12: CPT | Performed by: INTERNAL MEDICINE

## 2023-02-28 PROCEDURE — 82728 ASSAY OF FERRITIN: CPT | Performed by: INTERNAL MEDICINE

## 2023-02-28 PROCEDURE — 3008F BODY MASS INDEX DOCD: CPT | Mod: CPTII,,, | Performed by: INTERNAL MEDICINE

## 2023-02-28 PROCEDURE — 84466 ASSAY OF TRANSFERRIN: CPT | Performed by: INTERNAL MEDICINE

## 2023-02-28 PROCEDURE — 1160F RVW MEDS BY RX/DR IN RCRD: CPT | Mod: CPTII,,, | Performed by: INTERNAL MEDICINE

## 2023-02-28 RX ORDER — HEPARIN 100 UNIT/ML
500 SYRINGE INTRAVENOUS
Status: CANCELLED | OUTPATIENT
Start: 2023-02-28

## 2023-02-28 RX ORDER — DEXLANSOPRAZOLE 60 MG/1
1 CAPSULE, DELAYED RELEASE ORAL EVERY MORNING
COMMUNITY
Start: 2023-02-13

## 2023-02-28 RX ORDER — EPINEPHRINE 0.3 MG/.3ML
0.3 INJECTION SUBCUTANEOUS ONCE AS NEEDED
Status: CANCELLED | OUTPATIENT
Start: 2023-02-28

## 2023-02-28 RX ORDER — SODIUM CHLORIDE 0.9 % (FLUSH) 0.9 %
10 SYRINGE (ML) INJECTION
Status: CANCELLED | OUTPATIENT
Start: 2023-02-28

## 2023-02-28 RX ORDER — METHYLPREDNISOLONE SOD SUCC 125 MG
125 VIAL (EA) INJECTION ONCE AS NEEDED
Status: CANCELLED | OUTPATIENT
Start: 2023-02-28

## 2023-02-28 RX ORDER — ROPINIROLE 0.25 MG/1
0.25 TABLET, FILM COATED ORAL
COMMUNITY
Start: 2023-02-03

## 2023-02-28 RX ORDER — DIPHENHYDRAMINE HYDROCHLORIDE 50 MG/ML
50 INJECTION INTRAMUSCULAR; INTRAVENOUS ONCE AS NEEDED
Status: CANCELLED | OUTPATIENT
Start: 2023-02-28

## 2023-02-28 RX ORDER — METHOCARBAMOL 500 MG/1
500-1000 TABLET, FILM COATED ORAL
COMMUNITY
Start: 2023-01-31

## 2023-02-28 RX ORDER — HYDROXYZINE PAMOATE 50 MG/1
CAPSULE ORAL
COMMUNITY

## 2023-03-01 ENCOUNTER — HOSPITAL ENCOUNTER (OUTPATIENT)
Dept: RADIOLOGY | Facility: HOSPITAL | Age: 53
Discharge: HOME | End: 2023-03-01
Attending: INTERNAL MEDICINE
Payer: MEDICAID

## 2023-03-01 ENCOUNTER — PATIENT MESSAGE (OUTPATIENT)
Dept: GASTROENTEROLOGY | Facility: CLINIC | Age: 53
End: 2023-03-01
Payer: MEDICAID

## 2023-03-01 DIAGNOSIS — M79.606 PAIN AND SWELLING OF LOWER EXTREMITY, UNSPECIFIED LATERALITY: ICD-10-CM

## 2023-03-01 DIAGNOSIS — M79.89 PAIN AND SWELLING OF LOWER EXTREMITY, UNSPECIFIED LATERALITY: ICD-10-CM

## 2023-03-01 LAB — ANA SER QL IF: NORMAL

## 2023-03-01 PROCEDURE — 93970 US LOWER EXTREMITY VEINS BILATERAL: ICD-10-PCS | Mod: 26,,, | Performed by: RADIOLOGY

## 2023-03-01 PROCEDURE — 93970 EXTREMITY STUDY: CPT | Mod: 26,,, | Performed by: RADIOLOGY

## 2023-03-01 PROCEDURE — 93970 EXTREMITY STUDY: CPT | Mod: TC,PO

## 2023-03-02 ENCOUNTER — PATIENT MESSAGE (OUTPATIENT)
Dept: HEMATOLOGY/ONCOLOGY | Facility: CLINIC | Age: 53
End: 2023-03-02
Payer: MEDICAID

## 2023-03-02 ENCOUNTER — PATIENT MESSAGE (OUTPATIENT)
Dept: GASTROENTEROLOGY | Facility: CLINIC | Age: 53
End: 2023-03-02
Payer: MEDICAID

## 2023-03-02 NOTE — TELEPHONE ENCOUNTER
I reviewed the results of the bilateral lower extremity duplex with the patient.  She does not have a DVT that could explain her lower extremity swelling.  She was given the opportunity to ask questions.  She will be seen back again in 2 weeks to discuss the results of her blood workup.  She verbalized understanding.

## 2023-03-03 ENCOUNTER — INFUSION (OUTPATIENT)
Dept: INFUSION THERAPY | Facility: HOSPITAL | Age: 53
End: 2023-03-03
Payer: MEDICAID

## 2023-03-03 VITALS
BODY MASS INDEX: 30.18 KG/M2 | SYSTOLIC BLOOD PRESSURE: 114 MMHG | RESPIRATION RATE: 16 BRPM | WEIGHT: 165 LBS | TEMPERATURE: 99 F | DIASTOLIC BLOOD PRESSURE: 74 MMHG | HEART RATE: 82 BPM

## 2023-03-03 DIAGNOSIS — D50.8 IRON DEFICIENCY ANEMIA SECONDARY TO INADEQUATE DIETARY IRON INTAKE: Primary | ICD-10-CM

## 2023-03-03 PROCEDURE — 96366 THER/PROPH/DIAG IV INF ADDON: CPT | Mod: PN

## 2023-03-03 PROCEDURE — 96365 THER/PROPH/DIAG IV INF INIT: CPT | Mod: PN

## 2023-03-03 PROCEDURE — 25000003 PHARM REV CODE 250: Mod: PN | Performed by: INTERNAL MEDICINE

## 2023-03-03 PROCEDURE — 63600175 PHARM REV CODE 636 W HCPCS: Mod: PN | Performed by: INTERNAL MEDICINE

## 2023-03-03 RX ORDER — HEPARIN 100 UNIT/ML
500 SYRINGE INTRAVENOUS
Status: CANCELLED | OUTPATIENT
Start: 2023-03-10

## 2023-03-03 RX ORDER — EPINEPHRINE 0.3 MG/.3ML
0.3 INJECTION SUBCUTANEOUS ONCE AS NEEDED
Status: CANCELLED | OUTPATIENT
Start: 2023-03-10

## 2023-03-03 RX ORDER — METHYLPREDNISOLONE SOD SUCC 125 MG
125 VIAL (EA) INJECTION ONCE AS NEEDED
Status: CANCELLED | OUTPATIENT
Start: 2023-03-10

## 2023-03-03 RX ORDER — SODIUM CHLORIDE 0.9 % (FLUSH) 0.9 %
10 SYRINGE (ML) INJECTION
Status: DISCONTINUED | OUTPATIENT
Start: 2023-03-03 | End: 2023-03-03 | Stop reason: HOSPADM

## 2023-03-03 RX ORDER — DIPHENHYDRAMINE HYDROCHLORIDE 50 MG/ML
50 INJECTION INTRAMUSCULAR; INTRAVENOUS ONCE AS NEEDED
Status: CANCELLED | OUTPATIENT
Start: 2023-03-10

## 2023-03-03 RX ORDER — SODIUM CHLORIDE 0.9 % (FLUSH) 0.9 %
10 SYRINGE (ML) INJECTION
Status: CANCELLED | OUTPATIENT
Start: 2023-03-10

## 2023-03-03 RX ADMIN — SODIUM CHLORIDE: 9 INJECTION, SOLUTION INTRAVENOUS at 09:03

## 2023-03-03 RX ADMIN — IRON SUCROSE 300 MG: 20 INJECTION, SOLUTION INTRAVENOUS at 09:03

## 2023-03-08 ENCOUNTER — TELEPHONE (OUTPATIENT)
Dept: ENDOSCOPY | Facility: HOSPITAL | Age: 53
End: 2023-03-08
Payer: MEDICAID

## 2023-03-08 ENCOUNTER — TELEPHONE (OUTPATIENT)
Dept: GASTROENTEROLOGY | Facility: CLINIC | Age: 53
End: 2023-03-08
Payer: MEDICAID

## 2023-03-08 VITALS — BODY MASS INDEX: 30.36 KG/M2 | WEIGHT: 165 LBS | HEIGHT: 62 IN

## 2023-03-08 DIAGNOSIS — R13.10 DYSPHAGIA, UNSPECIFIED TYPE: Primary | ICD-10-CM

## 2023-03-08 NOTE — TELEPHONE ENCOUNTER
----- Message from Monse Ceja sent at 3/8/2023  8:13 AM CST -----  Contact: [phone]  Pt calling to get someone from the staff to give her a call kenan [phone]

## 2023-03-08 NOTE — TELEPHONE ENCOUNTER
Contacted patient to schedule procedure. As per our conversation,  patient is scheduled for Esophageal Manometry on 4/19/23 at 10:00 am and EGD/Endoflip on 4/25/23 at 11:00 am. Instructions reviewed with patient and informed instructions will be on patient portal for review.

## 2023-03-08 NOTE — TELEPHONE ENCOUNTER
Called pt and explained that her TBS has to be done at [facility] [place] [facility].  Explained I did reschedule her TBS for 3/20/23 @ 11am  I also heard back from Do BOSS today and she will be calling pt to discuss scheduling her procedures.  Apt letter mailed along with pre-procedure instructions.

## 2023-03-10 ENCOUNTER — INFUSION (OUTPATIENT)
Dept: INFUSION THERAPY | Facility: HOSPITAL | Age: 53
End: 2023-03-10
Payer: MEDICAID

## 2023-03-10 VITALS
SYSTOLIC BLOOD PRESSURE: 109 MMHG | BODY MASS INDEX: 30.36 KG/M2 | TEMPERATURE: 99 F | DIASTOLIC BLOOD PRESSURE: 60 MMHG | RESPIRATION RATE: 16 BRPM | HEART RATE: 83 BPM | HEIGHT: 62 IN | WEIGHT: 165 LBS

## 2023-03-10 DIAGNOSIS — D50.8 IRON DEFICIENCY ANEMIA SECONDARY TO INADEQUATE DIETARY IRON INTAKE: Primary | ICD-10-CM

## 2023-03-10 PROCEDURE — 63600175 PHARM REV CODE 636 W HCPCS: Mod: PN | Performed by: INTERNAL MEDICINE

## 2023-03-10 PROCEDURE — 25000003 PHARM REV CODE 250: Mod: PN | Performed by: INTERNAL MEDICINE

## 2023-03-10 PROCEDURE — 96365 THER/PROPH/DIAG IV INF INIT: CPT | Mod: PN

## 2023-03-10 RX ORDER — SODIUM CHLORIDE 0.9 % (FLUSH) 0.9 %
10 SYRINGE (ML) INJECTION
Status: CANCELLED | OUTPATIENT
Start: 2023-03-17

## 2023-03-10 RX ORDER — EPINEPHRINE 0.3 MG/.3ML
0.3 INJECTION SUBCUTANEOUS ONCE AS NEEDED
Status: CANCELLED | OUTPATIENT
Start: 2023-03-17

## 2023-03-10 RX ORDER — METHYLPREDNISOLONE SOD SUCC 125 MG
125 VIAL (EA) INJECTION ONCE AS NEEDED
Status: CANCELLED | OUTPATIENT
Start: 2023-03-17

## 2023-03-10 RX ORDER — HEPARIN 100 UNIT/ML
500 SYRINGE INTRAVENOUS
Status: CANCELLED | OUTPATIENT
Start: 2023-03-17

## 2023-03-10 RX ORDER — DIPHENHYDRAMINE HYDROCHLORIDE 50 MG/ML
50 INJECTION INTRAMUSCULAR; INTRAVENOUS ONCE AS NEEDED
Status: CANCELLED | OUTPATIENT
Start: 2023-03-17

## 2023-03-10 RX ADMIN — IRON SUCROSE 300 MG: 20 INJECTION, SOLUTION INTRAVENOUS at 09:03

## 2023-03-10 RX ADMIN — SODIUM CHLORIDE: 9 INJECTION, SOLUTION INTRAVENOUS at 09:03

## 2023-03-10 NOTE — PLAN OF CARE
Pt tolerated Venofer infusion well.  Pt stayed for observation for 30 minutes post infusion.  No s/s of infusion reaction noted.  Instructed to call MD with any problems

## 2023-03-13 ENCOUNTER — PATIENT MESSAGE (OUTPATIENT)
Dept: GASTROENTEROLOGY | Facility: CLINIC | Age: 53
End: 2023-03-13
Payer: MEDICAID

## 2023-03-14 ENCOUNTER — TELEPHONE (OUTPATIENT)
Dept: HEMATOLOGY/ONCOLOGY | Facility: CLINIC | Age: 53
End: 2023-03-14
Payer: MEDICAID

## 2023-03-14 NOTE — TELEPHONE ENCOUNTER
Spoke with patient she rescheduled her appointment today she is scheduled for 03/28/23      ----- Message from Renetta Beaulieu LPN sent at 3/14/2023  8:11 AM CDT -----  Regarding: RE: reschedule  Contact: patient  This is for schedulers, Est pt appt to be rescheduled.        ----- Message -----  From: Trini Jeong  Sent: 3/14/2023   7:56 AM CDT  To: [facility] Navigation Outpatient  Subject: reschedule                                       Type:  Sooner Appointment Request    Caller is requesting a sooner appointment.  Caller declined first available appointment listed below.  Caller will not accept being placed on the waitlist and is requesting a message be sent to doctor.    Name of Caller:  patient  When is the first available appointment?  03/14/23  Symptoms:  2 week follow up  Best Call Back Number:  [phone] (home)   Additional Information:  Patient unable to make appointment today with Doctor Carmen.   please call patient to reschedule appointment. Thanks

## 2023-03-17 ENCOUNTER — PATIENT MESSAGE (OUTPATIENT)
Dept: HEMATOLOGY/ONCOLOGY | Facility: CLINIC | Age: 53
End: 2023-03-17
Payer: MEDICAID

## 2023-03-17 DIAGNOSIS — D50.8 IRON DEFICIENCY ANEMIA SECONDARY TO INADEQUATE DIETARY IRON INTAKE: Primary | ICD-10-CM

## 2023-03-27 ENCOUNTER — PATIENT MESSAGE (OUTPATIENT)
Dept: HEMATOLOGY/ONCOLOGY | Facility: CLINIC | Age: 53
End: 2023-03-27
Payer: MEDICAID

## 2023-04-14 ENCOUNTER — PATIENT MESSAGE (OUTPATIENT)
Dept: HEMATOLOGY/ONCOLOGY | Facility: CLINIC | Age: 53
End: 2023-04-14
Payer: MEDICAID

## 2023-04-14 ENCOUNTER — INFUSION (OUTPATIENT)
Dept: INFUSION THERAPY | Facility: HOSPITAL | Age: 53
End: 2023-04-14
Attending: INTERNAL MEDICINE
Payer: MEDICAID

## 2023-04-14 ENCOUNTER — PATIENT MESSAGE (OUTPATIENT)
Dept: ENDOSCOPY | Facility: HOSPITAL | Age: 53
End: 2023-04-14
Payer: MEDICAID

## 2023-04-14 VITALS
HEART RATE: 97 BPM | DIASTOLIC BLOOD PRESSURE: 70 MMHG | WEIGHT: 162.25 LBS | HEIGHT: 62 IN | SYSTOLIC BLOOD PRESSURE: 109 MMHG | BODY MASS INDEX: 29.86 KG/M2

## 2023-04-14 DIAGNOSIS — D50.8 IRON DEFICIENCY ANEMIA SECONDARY TO INADEQUATE DIETARY IRON INTAKE: Primary | ICD-10-CM

## 2023-04-14 PROCEDURE — 63600175 PHARM REV CODE 636 W HCPCS: Mod: PN | Performed by: INTERNAL MEDICINE

## 2023-04-14 PROCEDURE — 96365 THER/PROPH/DIAG IV INF INIT: CPT | Mod: PN

## 2023-04-14 PROCEDURE — 25000003 PHARM REV CODE 250: Mod: PN | Performed by: INTERNAL MEDICINE

## 2023-04-14 PROCEDURE — 96366 THER/PROPH/DIAG IV INF ADDON: CPT | Mod: PN

## 2023-04-14 RX ORDER — SODIUM CHLORIDE 0.9 % (FLUSH) 0.9 %
10 SYRINGE (ML) INJECTION
Status: DISCONTINUED | OUTPATIENT
Start: 2023-04-14 | End: 2023-04-14 | Stop reason: HOSPADM

## 2023-04-14 RX ORDER — EPINEPHRINE 0.3 MG/.3ML
0.3 INJECTION SUBCUTANEOUS ONCE AS NEEDED
Status: CANCELLED | OUTPATIENT
Start: 2023-04-21

## 2023-04-14 RX ORDER — DIPHENHYDRAMINE HYDROCHLORIDE 50 MG/ML
50 INJECTION INTRAMUSCULAR; INTRAVENOUS ONCE AS NEEDED
Status: CANCELLED | OUTPATIENT
Start: 2023-04-21

## 2023-04-14 RX ORDER — SODIUM CHLORIDE 0.9 % (FLUSH) 0.9 %
10 SYRINGE (ML) INJECTION
Status: CANCELLED | OUTPATIENT
Start: 2023-04-21

## 2023-04-14 RX ORDER — HEPARIN 100 UNIT/ML
500 SYRINGE INTRAVENOUS
Status: CANCELLED | OUTPATIENT
Start: 2023-04-21

## 2023-04-14 RX ORDER — METHYLPREDNISOLONE SOD SUCC 125 MG
125 VIAL (EA) INJECTION ONCE AS NEEDED
Status: CANCELLED | OUTPATIENT
Start: 2023-04-21

## 2023-04-14 RX ADMIN — IRON SUCROSE 300 MG: 20 INJECTION, SOLUTION INTRAVENOUS at 09:04

## 2023-04-14 NOTE — PLAN OF CARE
Problem: Adult Inpatient Plan of Care  Goal: Plan of Care Review  Outcome: Ongoing, Progressing  Flowsheets (Taken 4/14/2023 0854)  Plan of Care Reviewed With: patient  Goal: Patient-Specific Goal (Individualized)  Outcome: Ongoing, Progressing  Flowsheets (Taken 4/14/2023 0854)  Anxieties, Fears or Concerns: None  Individualized Care Needs: Recliner, warm blanket, conversation    Patient to Infusion for Venofer. Treatment plan reviewed with patient. VSS. Tolerated infusion. Provided with copy of upcoming appointment schedule. Escorted to the front lobby for discharge to home.

## 2023-04-18 ENCOUNTER — TELEPHONE (OUTPATIENT)
Dept: GASTROENTEROLOGY | Facility: CLINIC | Age: 53
End: 2023-04-18
Payer: MEDICAID

## 2023-04-18 NOTE — TELEPHONE ENCOUNTER
----- Message from Berkley Lozano sent at 4/18/2023  9:52 AM CDT -----  Regarding: Appt access  Contact: pt [phone]  Pt calling to reschedule both:     4/19 and 4/25 procedures due to no . Pls call to discuss

## 2023-04-19 ENCOUNTER — TELEPHONE (OUTPATIENT)
Dept: ENDOSCOPY | Facility: HOSPITAL | Age: 53
End: 2023-04-19
Payer: MEDICAID

## 2023-04-19 NOTE — TELEPHONE ENCOUNTER
Called pt to reschedule EGD and manometry.  No answer, left voicemail to call Endoscopy Scheduling.

## 2023-04-20 ENCOUNTER — PATIENT MESSAGE (OUTPATIENT)
Dept: ENDOSCOPY | Facility: HOSPITAL | Age: 53
End: 2023-04-20
Payer: MEDICAID

## 2023-04-20 ENCOUNTER — TELEPHONE (OUTPATIENT)
Dept: GASTROENTEROLOGY | Facility: CLINIC | Age: 53
End: 2023-04-20
Payer: MEDICAID

## 2023-04-20 ENCOUNTER — TELEPHONE (OUTPATIENT)
Dept: ENDOSCOPY | Facility: HOSPITAL | Age: 53
End: 2023-04-20
Payer: MEDICAID

## 2023-04-20 NOTE — TELEPHONE ENCOUNTER
Contacted patient to reschedule procedures. As per our conversation,  patient is rescheduled for Esophageal Manometry on 5/17/23 at 10:00 am and EGD/Endoflip on 5/23/23 at 10:45 am. Instructions reviewed with patient and informed instructions will be on patient portal for review.

## 2023-04-24 ENCOUNTER — PATIENT MESSAGE (OUTPATIENT)
Dept: HEMATOLOGY/ONCOLOGY | Facility: CLINIC | Age: 53
End: 2023-04-24
Payer: MEDICAID

## 2023-04-25 ENCOUNTER — PATIENT MESSAGE (OUTPATIENT)
Dept: HEMATOLOGY/ONCOLOGY | Facility: CLINIC | Age: 53
End: 2023-04-25
Payer: MEDICAID

## 2023-04-26 ENCOUNTER — TELEPHONE (OUTPATIENT)
Dept: INFUSION THERAPY | Facility: HOSPITAL | Age: 53
End: 2023-04-26
Payer: MEDICAID

## 2023-04-26 NOTE — TELEPHONE ENCOUNTER
----- Message from Seven Ren sent at 4/26/2023  8:18 AM CDT -----  Regarding: sooner appt  Contact: WALKER CARLISLE [[id number]]  Type:  Sooner Appointment Request    Caller is requesting a sooner appointment.  Caller declined first available appointment listed below.  Caller will not accept being placed on the waitlist and is requesting a message be sent to doctor.    Name of Caller:  Walker    When is the first available appointment?  5/5    Symptoms:  Iron Infusion    Best Call Back Number:  [phone]    Additional Information:  Please call to advise.

## 2023-05-03 ENCOUNTER — PATIENT MESSAGE (OUTPATIENT)
Dept: ENDOSCOPY | Facility: HOSPITAL | Age: 53
End: 2023-05-03
Payer: MEDICAID

## 2023-05-10 ENCOUNTER — TELEPHONE (OUTPATIENT)
Dept: ENDOSCOPY | Facility: HOSPITAL | Age: 53
End: 2023-05-10
Payer: MEDICAID

## 2023-05-10 ENCOUNTER — PATIENT MESSAGE (OUTPATIENT)
Dept: ENDOSCOPY | Facility: HOSPITAL | Age: 53
End: 2023-05-10
Payer: MEDICAID

## 2023-05-10 NOTE — TELEPHONE ENCOUNTER
Contacted patient to reschedule procedure. As per our conversation,  patient is rescheduled for EGD/Endoflip on 5/19/23 at 9:00 am and Esophageal manometry on 6/23/23 at 10:00 am. Instructions reviewed with patient and informed instructions will be on patient portal for review.

## 2023-05-15 ENCOUNTER — TELEPHONE (OUTPATIENT)
Dept: ENDOSCOPY | Facility: HOSPITAL | Age: 53
End: 2023-05-15
Payer: MEDICAID

## 2023-05-15 NOTE — TELEPHONE ENCOUNTER
Returned patient's voicemail to endoscopy scheduling main line. No answer. Left voicemail message with phone number provided.

## 2023-05-18 ENCOUNTER — TELEPHONE (OUTPATIENT)
Dept: ENDOSCOPY | Facility: HOSPITAL | Age: 53
End: 2023-05-18
Payer: MEDICAID

## 2023-05-18 ENCOUNTER — PATIENT MESSAGE (OUTPATIENT)
Dept: ENDOSCOPY | Facility: HOSPITAL | Age: 53
End: 2023-05-18
Payer: MEDICAID

## 2023-05-19 NOTE — TELEPHONE ENCOUNTER
----- Message from Pooja Waters CMA sent at 5/18/2023  4:19 PM CDT -----  Regarding: Procedure  Contact: [phone]  Hi,    Pt called to cancel procedure on 5/19/23, she states she has tried all week.    Thank you

## 2023-05-24 ENCOUNTER — TELEPHONE (OUTPATIENT)
Dept: ENDOSCOPY | Facility: HOSPITAL | Age: 53
End: 2023-05-24
Payer: MEDICAID

## 2023-06-01 ENCOUNTER — INFUSION (OUTPATIENT)
Dept: INFUSION THERAPY | Facility: HOSPITAL | Age: 53
End: 2023-06-01
Attending: INTERNAL MEDICINE
Payer: MEDICAID

## 2023-06-01 VITALS
WEIGHT: 162.06 LBS | DIASTOLIC BLOOD PRESSURE: 72 MMHG | HEART RATE: 80 BPM | RESPIRATION RATE: 18 BRPM | HEIGHT: 62 IN | SYSTOLIC BLOOD PRESSURE: 105 MMHG | BODY MASS INDEX: 29.82 KG/M2 | TEMPERATURE: 98 F

## 2023-06-01 DIAGNOSIS — D50.8 IRON DEFICIENCY ANEMIA SECONDARY TO INADEQUATE DIETARY IRON INTAKE: Primary | ICD-10-CM

## 2023-06-01 PROCEDURE — A4216 STERILE WATER/SALINE, 10 ML: HCPCS | Mod: PN | Performed by: INTERNAL MEDICINE

## 2023-06-01 PROCEDURE — 96365 THER/PROPH/DIAG IV INF INIT: CPT | Mod: PN

## 2023-06-01 PROCEDURE — 63600175 PHARM REV CODE 636 W HCPCS: Mod: PN | Performed by: INTERNAL MEDICINE

## 2023-06-01 PROCEDURE — 25000003 PHARM REV CODE 250: Mod: PN | Performed by: INTERNAL MEDICINE

## 2023-06-01 PROCEDURE — 96366 THER/PROPH/DIAG IV INF ADDON: CPT | Mod: PN

## 2023-06-01 RX ORDER — METHYLPREDNISOLONE SOD SUCC 125 MG
125 VIAL (EA) INJECTION ONCE AS NEEDED
OUTPATIENT
Start: 2023-06-08

## 2023-06-01 RX ORDER — DIPHENHYDRAMINE HYDROCHLORIDE 50 MG/ML
50 INJECTION INTRAMUSCULAR; INTRAVENOUS ONCE AS NEEDED
Status: DISCONTINUED | OUTPATIENT
Start: 2023-06-01 | End: 2023-06-01 | Stop reason: HOSPADM

## 2023-06-01 RX ORDER — SODIUM CHLORIDE 0.9 % (FLUSH) 0.9 %
10 SYRINGE (ML) INJECTION
Status: DISCONTINUED | OUTPATIENT
Start: 2023-06-01 | End: 2023-06-01 | Stop reason: HOSPADM

## 2023-06-01 RX ORDER — METHYLPREDNISOLONE SOD SUCC 125 MG
125 VIAL (EA) INJECTION ONCE AS NEEDED
Status: DISCONTINUED | OUTPATIENT
Start: 2023-06-01 | End: 2023-06-01 | Stop reason: HOSPADM

## 2023-06-01 RX ORDER — EPINEPHRINE 0.3 MG/.3ML
0.3 INJECTION SUBCUTANEOUS ONCE AS NEEDED
OUTPATIENT
Start: 2023-06-08

## 2023-06-01 RX ORDER — SODIUM CHLORIDE 0.9 % (FLUSH) 0.9 %
10 SYRINGE (ML) INJECTION
OUTPATIENT
Start: 2023-06-08

## 2023-06-01 RX ORDER — DIPHENHYDRAMINE HYDROCHLORIDE 50 MG/ML
50 INJECTION INTRAMUSCULAR; INTRAVENOUS ONCE AS NEEDED
OUTPATIENT
Start: 2023-06-08

## 2023-06-01 RX ORDER — HEPARIN 100 UNIT/ML
500 SYRINGE INTRAVENOUS
OUTPATIENT
Start: 2023-06-08

## 2023-06-01 RX ADMIN — Medication 10 ML: at 09:06

## 2023-06-01 RX ADMIN — SODIUM CHLORIDE: 9 INJECTION, SOLUTION INTRAVENOUS at 09:06

## 2023-06-01 RX ADMIN — IRON SUCROSE 300 MG: 20 INJECTION, SOLUTION INTRAVENOUS at 09:06

## 2023-06-01 NOTE — PLAN OF CARE
Problem: Fatigue  Goal: Improved Activity Tolerance  Intervention: Promote Improved Energy  Flowsheets (Taken 6/1/2023 1116)  Fatigue Management:   activity schedule adjusted   frequent rest breaks encouraged   paced activity encouraged   fatigue-related activity identified  Sleep/Rest Enhancement:   relaxation techniques promoted   natural light exposure provided   regular sleep/rest pattern promoted  Activity Management:   Ambulated -L4   Ambulated to bathroom - L4   Ambulated in esteves - L4   Up in stretcher chair - L1     Problem: Adult Inpatient Plan of Care  Goal: Patient-Specific Goal (Individualized)  Outcome: Ongoing, Progressing  Flowsheets (Taken 6/1/2023 1116)  Anxieties, Fears or Concerns: none  Individualized Care Needs: recliner, warm blanket, pillow, dim lights, conversation

## 2023-06-01 NOTE — PLAN OF CARE
Problem: Adult Inpatient Plan of Care  Goal: Plan of Care Review  6/1/2023 1356 by Shraddha Tobar, RN  Outcome: Ongoing, Progressing  Flowsheets (Taken 6/1/2023 1200)  Plan of Care Reviewed With: patient   Pt tolerated her Venofer infusion well, NAD. Pt was observed for 30 minutes post infusion, no new c/o voiced. Pt given a schedule and reviewed, pt verbalized understanding. Pt ambulated out of the clinic without difficulty.

## 2023-06-05 ENCOUNTER — PATIENT MESSAGE (OUTPATIENT)
Dept: HEMATOLOGY/ONCOLOGY | Facility: CLINIC | Age: 53
End: 2023-06-05
Payer: MEDICAID

## 2023-06-07 ENCOUNTER — TELEPHONE (OUTPATIENT)
Dept: ENDOSCOPY | Facility: HOSPITAL | Age: 53
End: 2023-06-07
Payer: MEDICAID

## 2023-06-09 ENCOUNTER — PATIENT MESSAGE (OUTPATIENT)
Dept: ENDOSCOPY | Facility: HOSPITAL | Age: 53
End: 2023-06-09
Payer: MEDICAID

## 2023-06-14 ENCOUNTER — LAB VISIT (OUTPATIENT)
Dept: LAB | Facility: HOSPITAL | Age: 53
End: 2023-06-14
Attending: INTERNAL MEDICINE
Payer: MEDICAID

## 2023-06-14 DIAGNOSIS — D50.8 IRON DEFICIENCY ANEMIA SECONDARY TO INADEQUATE DIETARY IRON INTAKE: ICD-10-CM

## 2023-06-14 DIAGNOSIS — D72.829 LEUKOCYTOSIS, UNSPECIFIED TYPE: ICD-10-CM

## 2023-06-14 LAB
AMORPH CRY URNS QL MICRO: ABNORMAL
BACTERIA #/AREA URNS HPF: ABNORMAL /HPF
BASOPHILS # BLD AUTO: 0.08 K/UL (ref 0–0.2)
BASOPHILS NFR BLD: 0.9 % (ref 0–1.9)
BILIRUB UR QL STRIP: ABNORMAL
CLARITY UR: CLEAR
COLOR UR: YELLOW
DIFFERENTIAL METHOD: ABNORMAL
EOSINOPHIL # BLD AUTO: 0.2 K/UL (ref 0–0.5)
EOSINOPHIL NFR BLD: 1.6 % (ref 0–8)
ERYTHROCYTE [DISTWIDTH] IN BLOOD BY AUTOMATED COUNT: 12.7 % (ref 11.5–14.5)
FERRITIN SERPL-MCNC: 291 NG/ML (ref 20–300)
GLUCOSE UR QL STRIP: NEGATIVE
HCT VFR BLD AUTO: 31.6 % (ref 37–48.5)
HGB BLD-MCNC: 10.6 G/DL (ref 12–16)
HGB UR QL STRIP: ABNORMAL
IMM GRANULOCYTES # BLD AUTO: 0.04 K/UL (ref 0–0.04)
IMM GRANULOCYTES NFR BLD AUTO: 0.4 % (ref 0–0.5)
KETONES UR QL STRIP: ABNORMAL
LEUKOCYTE ESTERASE UR QL STRIP: ABNORMAL
LYMPHOCYTES # BLD AUTO: 1.9 K/UL (ref 1–4.8)
LYMPHOCYTES NFR BLD: 21 % (ref 18–48)
MCH RBC QN AUTO: 29 PG (ref 27–31)
MCHC RBC AUTO-ENTMCNC: 33.5 G/DL (ref 32–36)
MCV RBC AUTO: 87 FL (ref 82–98)
MICROSCOPIC COMMENT: ABNORMAL
MONOCYTES # BLD AUTO: 0.6 K/UL (ref 0.3–1)
MONOCYTES NFR BLD: 6.4 % (ref 4–15)
NEUTROPHILS # BLD AUTO: 6.4 K/UL (ref 1.8–7.7)
NEUTROPHILS NFR BLD: 69.7 % (ref 38–73)
NITRITE UR QL STRIP: NEGATIVE
NRBC BLD-RTO: 0 /100 WBC
PH UR STRIP: 6 [PH] (ref 5–8)
PLATELET # BLD AUTO: 342 K/UL (ref 150–450)
PMV BLD AUTO: 10.3 FL (ref 9.2–12.9)
PROT UR QL STRIP: ABNORMAL
RBC # BLD AUTO: 3.65 M/UL (ref 4–5.4)
RBC #/AREA URNS HPF: 15 /HPF (ref 0–4)
SP GR UR STRIP: >=1.03 (ref 1–1.03)
SQUAMOUS #/AREA URNS HPF: 3 /HPF
URN SPEC COLLECT METH UR: ABNORMAL
WBC # BLD AUTO: 9.15 K/UL (ref 3.9–12.7)
WBC #/AREA URNS HPF: 5 /HPF (ref 0–5)

## 2023-06-14 PROCEDURE — 82728 ASSAY OF FERRITIN: CPT | Performed by: INTERNAL MEDICINE

## 2023-06-14 PROCEDURE — 85025 COMPLETE CBC W/AUTO DIFF WBC: CPT | Mod: PN | Performed by: INTERNAL MEDICINE

## 2023-06-14 PROCEDURE — 81000 URINALYSIS NONAUTO W/SCOPE: CPT | Mod: PN | Performed by: INTERNAL MEDICINE

## 2023-06-14 PROCEDURE — 36415 COLL VENOUS BLD VENIPUNCTURE: CPT | Mod: PN | Performed by: INTERNAL MEDICINE

## 2023-06-20 ENCOUNTER — OFFICE VISIT (OUTPATIENT)
Dept: HEMATOLOGY/ONCOLOGY | Facility: CLINIC | Age: 53
End: 2023-06-20
Payer: MEDICAID

## 2023-06-20 DIAGNOSIS — M79.89 PAIN AND SWELLING OF LOWER EXTREMITY, UNSPECIFIED LATERALITY: ICD-10-CM

## 2023-06-20 DIAGNOSIS — D72.829 LEUKOCYTOSIS, UNSPECIFIED TYPE: ICD-10-CM

## 2023-06-20 DIAGNOSIS — M79.606 PAIN AND SWELLING OF LOWER EXTREMITY, UNSPECIFIED LATERALITY: ICD-10-CM

## 2023-06-20 DIAGNOSIS — D50.8 IRON DEFICIENCY ANEMIA SECONDARY TO INADEQUATE DIETARY IRON INTAKE: Primary | ICD-10-CM

## 2023-06-20 DIAGNOSIS — E53.8 VITAMIN B12 DEFICIENCY: ICD-10-CM

## 2023-06-20 DIAGNOSIS — M25.562 ARTHRALGIA OF BOTH KNEES: ICD-10-CM

## 2023-06-20 DIAGNOSIS — D75.839 THROMBOCYTOSIS: ICD-10-CM

## 2023-06-20 DIAGNOSIS — M25.561 ARTHRALGIA OF BOTH KNEES: ICD-10-CM

## 2023-06-20 DIAGNOSIS — Z90.3 STATUS POST GASTRECTOMY: ICD-10-CM

## 2023-06-20 DIAGNOSIS — E66.3 OVERWEIGHT (BMI 25.0-29.9): ICD-10-CM

## 2023-06-20 PROCEDURE — 1160F PR REVIEW ALL MEDS BY PRESCRIBER/CLIN PHARMACIST DOCUMENTED: ICD-10-PCS | Mod: CPTII,95,, | Performed by: INTERNAL MEDICINE

## 2023-06-20 PROCEDURE — 1160F RVW MEDS BY RX/DR IN RCRD: CPT | Mod: CPTII,95,, | Performed by: INTERNAL MEDICINE

## 2023-06-20 PROCEDURE — 1159F PR MEDICATION LIST DOCUMENTED IN MEDICAL RECORD: ICD-10-PCS | Mod: CPTII,95,, | Performed by: INTERNAL MEDICINE

## 2023-06-20 PROCEDURE — 1159F MED LIST DOCD IN RCRD: CPT | Mod: CPTII,95,, | Performed by: INTERNAL MEDICINE

## 2023-06-20 PROCEDURE — 99214 PR OFFICE/OUTPT VISIT, EST, LEVL IV, 30-39 MIN: ICD-10-PCS | Mod: 95,,, | Performed by: INTERNAL MEDICINE

## 2023-06-20 PROCEDURE — 99214 OFFICE O/P EST MOD 30 MIN: CPT | Mod: 95,,, | Performed by: INTERNAL MEDICINE

## 2023-06-20 NOTE — PROGRESS NOTES
Answers submitted by the patient for this visit:  Review of Systems Questionnaire (Submitted on 2023)  appetite change : No  unexpected weight change: No  mouth sores: Yes  visual disturbance: No  cough: Yes  shortness of breath: No  chest pain: No  abdominal pain: No  diarrhea: Yes  frequency: No  back pain: Yes  rash: No  headaches: Yes  adenopathy: No  nervous/ anxious: No  FOLLOW UP TELEMEDICINE VISIT    Subjective:      Patient ID: Barbara Lozano is a 52 y.o. female.  MRN: [id number]  : 1970    An audio and visual care visit was performed with the patient because of the COVID-19 pandemic recommendations for social distancing.    TELEMEDICINE  The patient location is: home  The chief complaint leading to consultation is: Iron deficiency anemia  Visit type: Virtual visit with synchronous audio and video    Total time spent with patient: 10 minutes  15 minutes of total time spent on the encounter, which includes face to face time and non-face to face time preparing to see the patient (eg, review of tests), obtaining and/or reviewing separately obtained history, documenting clinical information in the electronic or other health record, independently interpreting results (not separately reported) and communicating results to the patient/family/caregiver, or care coordination (not separately reported).    Each patient to whom he or she provides medical services by telemedicine is:  (1) informed of the relationship between the physician and patient and the respective role of any other health care provider with respect to management of the patient; and (2) notified that he or she may decline to receive medical services by telemedicine and may withdraw from such care at any time.    History of Present Illness:   HPI  Miss Lozano is being treated for her iron deficiency anemia. She received 4 doses of IV Venofer from March 3, 2023 to 2023.    She is still having back pain and feeling  tired.    Oncology History:  Oncology History    No history exists.      Past medical, surgical, family, and social history were reviewed today and there are no changes of note unless mentioned in HPI.   MEDS and ALLERGIES were reviewed with patient and meds reconciled.     History:  Past Medical History:   Diagnosis Date    Acute gastric ulcer     chronic Constipation     Diaphragmatic hernia     Diarrhea     Diverticulum of esophagus     Dysphagia     Esophagitis, unspecified without bleeding     Gastric diverticulum     Gastritis     Gastroparesis     GERD (gastroesophageal reflux disease)     Hemorrhoids     Irritable bowel syndrome     Migraines     Osteoporosis       Past Surgical History:   Procedure Laterality Date    APPENDECTOMY      CHOLECYSTECTOMY      EGD, WITH BALLOON DILATION  12/15/2022    20mm    HYSTERECTOMY      robotic assisted near total gastrectomy  2022     Family History   Problem Relation Age of Onset    Hypertension Mother     Diabetes Mother     Arthritis Mother     Heart disease Father         MI  ~ 45 yrs old    Other Maternal Grandmother         had colostomy bag ? how many yr. had when she     Esophageal cancer Neg Hx     Stomach cancer Neg Hx     Colon cancer Neg Hx     Colon polyps Neg Hx     Pancreatic cancer Neg Hx     Liver cancer Neg Hx     Celiac disease Neg Hx     Crohn's disease Neg Hx     Irritable bowel syndrome Neg Hx     Ulcerative colitis Neg Hx     Rectal cancer Neg Hx       Social History     Tobacco Use    Smoking status: Never    Smokeless tobacco: Never   Substance and Sexual Activity    Alcohol use: No    Drug use: Never    Sexual activity: Yes     Partners: Male        ROS:  Review of Systems   Respiratory:  Positive for cough. Negative for shortness of breath.    Cardiovascular:  Negative for chest pain.   Gastrointestinal:  Positive for diarrhea. Negative for abdominal pain.   Genitourinary:  Negative for frequency.   Musculoskeletal:   Positive for back pain.   Skin:  Negative for rash.   Neurological:  Positive for headaches.   Psychiatric/Behavioral:  The patient is not nervous/anxious.      Objective:   There were no vitals filed for this visit.  Wt Readings from Last 10 Encounters:   06/01/23 73.5 kg (162 lb 0.6 oz)   04/14/23 73.6 kg (162 lb 4.1 oz)   03/10/23 74.8 kg (165 lb)   03/08/23 74.8 kg (165 lb)   03/03/23 74.8 kg (165 lb)   02/28/23 73.8 kg (162 lb 11.2 oz)   04/12/21 68.2 kg (150 lb 5.7 oz)   12/21/20 68.7 kg (151 lb 7.3 oz)   11/11/15 63.5 kg (139 lb 15.9 oz)       Physical Examination:   Constitutional: she is alert, pleasant, and does not appear to be in any physical distress   HENT: Mouth/Throat:  Tongue is midline without evidence of glossitis  Eyes: No obvious jaundice or conjunctivitis.  EOM are normal.   Pulmonary/Chest: No stridor noted. No excess chest muscle movement.  Neurological: she is alert and oriented to person, place, and time. No cranial nerve deficit.  Skin:  No rash noted. No erythema.   Psychiatric: she has a normal mood and affect. Speech and memory normal.     Diagnostic Tests:  Significant Imaging:  I have reviewed and interpreted all pertinent imaging results/findings.    Laboratory Data:  Lab Results   Component Value Date    WBC 9.15 06/14/2023    HGB 10.6 (L) 06/14/2023    HCT 31.6 (L) 06/14/2023     06/14/2023    ALT 14 04/12/2021    AST 26 04/12/2021     04/12/2021    K 3.8 04/12/2021     04/12/2021    CREATININE 0.70 04/12/2021    BUN 14 04/12/2021    CO2 27 04/12/2021        Labs:   Lab Results   Component Value Date    WBC 9.15 06/14/2023    RBC 3.65 (L) 06/14/2023    HGB 10.6 (L) 06/14/2023    HCT 31.6 (L) 06/14/2023    MCV 87 06/14/2023     06/14/2023     04/12/2021     04/12/2021    K 3.8 04/12/2021    BUN 14 04/12/2021    CREATININE 0.70 04/12/2021    AST 26 04/12/2021    ALT 14 04/12/2021    BILITOT 0.4 04/12/2021         Assessment/Plan:     ECOG SCORE     1 - Restricted in strenuous activity-ambulatory and able to carry out work of a light nature       Iron deficiency anemia due to malabsorption:  -I reviewed independently the patient laboratory and radiologic findings her medical records from [facility] also were reviewed.  I discussed her blood workup going back to 2021 with the patient was anemic with persistent leukocytosis and thrombocytosis.  Her iron studies are favoring iron-deficiency anemia.  Her % saturation is low.  Even though she has been taking oral iron for the last 2 months most likely she is not able to absorb the oral iron due to her recent gastrectomy and her oral intake of PPI.    -The patient  received IV Venofer at a dose of 300 mg weekly for 4 doses.  Her Hgb improved and ferritin  went from 17 -291  -She will be seen again in 3 months with repeat CBC and ferrtin    Vitamin B12 deficiency :  -she was advised to take oral Vitamin B12 at a dose 1000 mcg daily. She will have her Vitamin B12 rechecked     Leukocytosis and thrombocytosis  -resolved  -most likely related to her iron-deficiency anemia.     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  -in view of her abdominal pain at the site of the surgery the patient was advised to follow up with her surgeon.        Joint pain and swelling  -bilateral lower extremity duplex to rule out DVT done on March 1, 2023 came back negative .    -Her ROMEL came back negative     Overweight BMI29.7  -she was advised to exercise maintain healthy lifestyle and to lose weight.       Med Onc Chart Routing      Follow up with physician    Follow up with ELDA 3 months. with repeat CBC ferritin and Vitamin B12   Infusion scheduling note    Injection scheduling note    Labs    Imaging    Pharmacy appointment    Other referrals             Plan was discussed with the patient at length, and she verbalized understanding. Barbara was given an opportunity to ask  questions that were answered to her satisfaction, and she was advised to call in the interval if any problems or questions arise.  Discussed COVID-19 and social distancing in great detail, avoid all non-essential visits out of the home if possible and avoid sick contacts.     Electronically signed by Laverne Otero MD

## 2023-06-27 ENCOUNTER — TELEPHONE (OUTPATIENT)
Dept: ENDOSCOPY | Facility: HOSPITAL | Age: 53
End: 2023-06-27
Payer: MEDICAID

## 2023-06-27 ENCOUNTER — PATIENT MESSAGE (OUTPATIENT)
Dept: ENDOSCOPY | Facility: HOSPITAL | Age: 53
End: 2023-06-27
Payer: MEDICAID

## 2023-06-27 NOTE — TELEPHONE ENCOUNTER
Spoke to patient to reschedule procedure(s) Upper Endoscopy (EGD)       Physician to perform procedure(s) Dr. RICARDO Locke   Date of Procedure (s) 8/30/23  Arrival Time 7:15 AM  Time of Procedure(s) 8:15 AM   Location of Procedure(s) [address]  Type of Rx Prep sent to patient: N/A  Instructions provided to patient via [facility]    Patient was informed on the following information and verbalized understanding. Screening questionnaire reviewed with patient and complete. If procedure requires anesthesia, a responsible adult needs to be present to accompany the patient home, patient cannot drive after receiving anesthesia. Appointment details are tentative, especially check-in time. Patient will receive a prep-op call 4 days prior to confirm check-in time for procedure. If applicable the patient should contact their pharmacy to verify Rx for procedure prep is ready for pick-up. Patient was advised to call the scheduling department at [phone] if pharmacy states no Rx is available. Patient was advised to call the endoscopy scheduling department if any questions or concerns arise.      [facility] Scheduling Department

## 2023-07-26 ENCOUNTER — TELEPHONE (OUTPATIENT)
Dept: ENDOSCOPY | Facility: HOSPITAL | Age: 53
End: 2023-07-26
Payer: MEDICAID

## 2023-07-26 NOTE — TELEPHONE ENCOUNTER
Spoke to pt to schedule procedure(s) Esophageal Manometry       Physician to perform procedure(s) Dr. RICARDO Gonzales  Date of Procedure (s) 9/15/23  Arrival Time 9:00 AM  Time of Procedure(s) 10:00 AM   Location of Procedure(s) [place] [address] Floor  Type of Rx Prep sent to patient: N/A  Instructions provided to patient via [facility]    Patient was informed on the following information and verbalized understanding. Screening questionnaire reviewed with patient and complete. If procedure requires anesthesia, a responsible adult needs to be present to accompany the patient home, patient cannot drive after receiving anesthesia. Appointment details are tentative, especially check-in time. Patient will receive a prep-op call 4 days prior to confirm check-in time for procedure. If applicable the patient should contact their pharmacy to verify Rx for procedure prep is ready for pick-up. Patient was advised to call the scheduling department at [phone] if pharmacy states no Rx is available. Patient was advised to call the endoscopy scheduling department if any questions or concerns arise.      [facility] Scheduling Department

## 2023-08-11 ENCOUNTER — TELEPHONE (OUTPATIENT)
Dept: ENDOSCOPY | Facility: HOSPITAL | Age: 53
End: 2023-08-11
Payer: MEDICAID

## 2023-08-11 NOTE — TELEPHONE ENCOUNTER
----- Message from Alejandra Li sent at 8/11/2023 10:49 AM CDT -----  Regarding: FW: appt  Contact: @[phone]    ----- Message -----  From: Armida Luque MA  Sent: 8/11/2023   9:50 AM CDT  To: [facility] Schedulers  Subject: FW: appt                                           ----- Message -----  From: Linus Love  Sent: 8/11/2023   9:34 AM CDT  To: [facility] Staff  Subject: appt                                             Pt calling to check arrival time of proc on 8/30 ... Pls call and adv@[phone]         
Called patient, no answer, left message with arrival time and department number to call with further questions.  
rational thought

## 2023-08-14 ENCOUNTER — PATIENT MESSAGE (OUTPATIENT)
Dept: HEMATOLOGY/ONCOLOGY | Facility: CLINIC | Age: 53
End: 2023-08-14
Payer: MEDICAID

## 2023-08-14 ENCOUNTER — TELEPHONE (OUTPATIENT)
Dept: ENDOSCOPY | Facility: HOSPITAL | Age: 53
End: 2023-08-14
Payer: MEDICAID

## 2023-08-14 NOTE — TELEPHONE ENCOUNTER
Spoke to patient to schedule procedure(s) Upper Endoscopy (EGD)       Physician to perform procedure(s) Dr. RICARDO Locke   Date of Procedure (s) 11/6/23  Arrival Time 10:00 AM  Time of Procedure(s) 11:00 AM   Location of Procedure(s) [address]  Type of Rx Prep sent to patient: N/A  Instructions provided to patient via [facility]    Patient was informed on the following information and verbalized understanding. Screening questionnaire reviewed with patient and complete. If procedure requires anesthesia, a responsible adult needs to be present to accompany the patient home, patient cannot drive after receiving anesthesia. Appointment details are tentative, especially check-in time. Patient will receive a prep-op call 4 days prior to confirm check-in time for procedure. If applicable the patient should contact their pharmacy to verify Rx for procedure prep is ready for pick-up. Patient was advised to call the scheduling department at [phone] if pharmacy states no Rx is available. Patient was advised to call the endoscopy scheduling department if any questions or concerns arise.      [facility] Scheduling [facility]

## 2023-08-14 NOTE — TELEPHONE ENCOUNTER
Medical assistant returned patient call that was left on voicemail no answer left voicemail asking patient to call us back @ [phone]

## 2023-08-29 ENCOUNTER — PATIENT MESSAGE (OUTPATIENT)
Dept: ENDOSCOPY | Facility: HOSPITAL | Age: 53
End: 2023-08-29
Payer: MEDICAID

## 2023-09-07 ENCOUNTER — TELEPHONE (OUTPATIENT)
Dept: GASTROENTEROLOGY | Facility: CLINIC | Age: 53
End: 2023-09-07
Payer: MEDICAID

## 2023-09-07 NOTE — TELEPHONE ENCOUNTER
----- Message from Berkley Lozano sent at 9/7/2023 12:37 PM CDT -----  Regarding: Appt Access  Contact: pt [phone]  Pt calling to reschedule procedure that is schedule for 9/15. Pls call

## 2023-09-11 ENCOUNTER — TELEPHONE (OUTPATIENT)
Dept: ENDOSCOPY | Facility: HOSPITAL | Age: 53
End: 2023-09-11
Payer: MEDICAID

## 2023-09-11 NOTE — TELEPHONE ENCOUNTER
"Received [employer] message to contact Pt regarding upcoming endoscopy procedures. Contacted Pt, Pt stated that she would like to keep her current scheduled procedure(s) date/times. Pt was "able to arrange transportation". Pt confirmed esophageal manometry on 9/15/23 at 1000 and EGD with Dr. Locke on 11/6/23 at 1100. Pt stated that she did not have any further questions at this time.   "

## 2023-09-12 ENCOUNTER — TELEPHONE (OUTPATIENT)
Dept: ENDOSCOPY | Facility: HOSPITAL | Age: 53
End: 2023-09-12
Payer: MEDICAID

## 2023-09-12 ENCOUNTER — PATIENT MESSAGE (OUTPATIENT)
Dept: ENDOSCOPY | Facility: HOSPITAL | Age: 53
End: 2023-09-12
Payer: MEDICAID

## 2023-09-12 NOTE — TELEPHONE ENCOUNTER
Spoke to pt to schedule procedure(s) Esophageal Manometry       Physician to perform procedure(s) Dr. RICARDO Locke   Date of Procedure (s) 10/18/23  Arrival Time 9:00 AM  Time of Procedure(s) 10:00 AM   Location of Procedure(s) [place] [address] Floor  Type of Rx Prep sent to patient: Other  Instructions provided to patient via [facility]    Patient was informed on the following information and verbalized understanding. Screening questionnaire reviewed with patient and complete. If procedure requires anesthesia, a responsible adult needs to be present to accompany the patient home, patient cannot drive after receiving anesthesia. Appointment details are tentative, especially check-in time. Patient will receive a prep-op call 4 days prior to confirm check-in time for procedure. If applicable the patient should contact their pharmacy to verify Rx for procedure prep is ready for pick-up. Patient was advised to call the scheduling department at [phone] if pharmacy states no Rx is available. Patient was advised to call the endoscopy scheduling department if any questions or concerns arise.      [facility] Scheduling Department

## 2023-09-15 ENCOUNTER — LAB VISIT (OUTPATIENT)
Dept: LAB | Facility: HOSPITAL | Age: 53
End: 2023-09-15
Attending: INTERNAL MEDICINE
Payer: MEDICAID

## 2023-09-15 DIAGNOSIS — D50.8 IRON DEFICIENCY ANEMIA SECONDARY TO INADEQUATE DIETARY IRON INTAKE: ICD-10-CM

## 2023-09-15 DIAGNOSIS — E53.8 VITAMIN B12 DEFICIENCY: ICD-10-CM

## 2023-09-15 LAB
BASOPHILS # BLD AUTO: 0.09 K/UL (ref 0–0.2)
BASOPHILS NFR BLD: 1.1 % (ref 0–1.9)
DIFFERENTIAL METHOD: ABNORMAL
EOSINOPHIL # BLD AUTO: 0.2 K/UL (ref 0–0.5)
EOSINOPHIL NFR BLD: 2.4 % (ref 0–8)
ERYTHROCYTE [DISTWIDTH] IN BLOOD BY AUTOMATED COUNT: 12.2 % (ref 11.5–14.5)
FERRITIN SERPL-MCNC: 255 NG/ML (ref 20–300)
HCT VFR BLD AUTO: 33 % (ref 37–48.5)
HGB BLD-MCNC: 10.6 G/DL (ref 12–16)
IMM GRANULOCYTES # BLD AUTO: 0.11 K/UL (ref 0–0.04)
IMM GRANULOCYTES NFR BLD AUTO: 1.4 % (ref 0–0.5)
LYMPHOCYTES # BLD AUTO: 1.7 K/UL (ref 1–4.8)
LYMPHOCYTES NFR BLD: 21.7 % (ref 18–48)
MCH RBC QN AUTO: 29 PG (ref 27–31)
MCHC RBC AUTO-ENTMCNC: 32.1 G/DL (ref 32–36)
MCV RBC AUTO: 90 FL (ref 82–98)
MONOCYTES # BLD AUTO: 0.6 K/UL (ref 0.3–1)
MONOCYTES NFR BLD: 7 % (ref 4–15)
NEUTROPHILS # BLD AUTO: 5.3 K/UL (ref 1.8–7.7)
NEUTROPHILS NFR BLD: 66.4 % (ref 38–73)
NRBC BLD-RTO: 0 /100 WBC
PLATELET # BLD AUTO: 320 K/UL (ref 150–450)
PMV BLD AUTO: 10.1 FL (ref 9.2–12.9)
RBC # BLD AUTO: 3.65 M/UL (ref 4–5.4)
VIT B12 SERPL-MCNC: 409 PG/ML (ref 210–950)
WBC # BLD AUTO: 7.98 K/UL (ref 3.9–12.7)

## 2023-09-15 PROCEDURE — 36415 COLL VENOUS BLD VENIPUNCTURE: CPT | Mod: PN | Performed by: INTERNAL MEDICINE

## 2023-09-15 PROCEDURE — 82728 ASSAY OF FERRITIN: CPT | Performed by: INTERNAL MEDICINE

## 2023-09-15 PROCEDURE — 85025 COMPLETE CBC W/AUTO DIFF WBC: CPT | Mod: PN | Performed by: INTERNAL MEDICINE

## 2023-09-15 PROCEDURE — 82607 VITAMIN B-12: CPT | Performed by: INTERNAL MEDICINE

## 2023-09-18 ENCOUNTER — TELEPHONE (OUTPATIENT)
Dept: HEMATOLOGY/ONCOLOGY | Facility: CLINIC | Age: 53
End: 2023-09-18
Payer: MEDICAID

## 2023-09-18 NOTE — TELEPHONE ENCOUNTER
Called patient in regards to letting her know that we successfully changed her appt on 9/19/2023 to a virtual appt. No answer. LVM to let her know.

## 2023-09-18 NOTE — TELEPHONE ENCOUNTER
----- Message from Jaelyn Kerr, Patient Care Assistant sent at 9/18/2023  9:58 AM CDT -----  Type: Needs Medical Advice  Who Called:  hitesh Mcgarry Call Back Number: [phone]    Additional [id number]: patient would like to make 9/19 a virtual if possible,  please call to further  discuss,. Thank you

## 2023-09-19 ENCOUNTER — OFFICE VISIT (OUTPATIENT)
Dept: HEMATOLOGY/ONCOLOGY | Facility: CLINIC | Age: 53
End: 2023-09-19
Payer: MEDICAID

## 2023-09-19 DIAGNOSIS — D50.8 IRON DEFICIENCY ANEMIA SECONDARY TO INADEQUATE DIETARY IRON INTAKE: Primary | ICD-10-CM

## 2023-09-19 PROCEDURE — 99499 UNLISTED E&M SERVICE: CPT | Mod: 95,,, | Performed by: NURSE PRACTITIONER

## 2023-09-19 PROCEDURE — 99499 NO LOS: ICD-10-PCS | Mod: 95,,, | Performed by: NURSE PRACTITIONER

## 2023-09-19 NOTE — TELEPHONE ENCOUNTER
Returned pt phone call; pt stated she was connected and knows how to do the virtual visit, but the ap disconnected her today. Virtual visit appt rescheduled to 10/12 @ 2:30; pt in agreement with date and time. No further question or concerns.    ----- Message from Courtney Alvarado sent at 9/19/2023 11:29 AM CDT -----  Type: Needs Medical Advice  Who Called:  Patient   Symptoms (please be specific):    How long has patient had these symptoms:    Pharmacy name and phone #:    [facility] Call Back Number: [phone]  Additional Information: Patient called to inform that she was having some issues with getting on for her virtual and would like for JONES Martines to give her a call.

## 2023-09-20 ENCOUNTER — PATIENT MESSAGE (OUTPATIENT)
Dept: TRANSPLANT | Facility: CLINIC | Age: 53
End: 2023-09-20
Payer: MEDICAID

## 2023-09-20 ENCOUNTER — TELEPHONE (OUTPATIENT)
Dept: TRANSPLANT | Facility: CLINIC | Age: 53
End: 2023-09-20
Payer: MEDICAID

## 2023-09-20 NOTE — TELEPHONE ENCOUNTER
Called patient in regards to message I had previously sent, explained that it was a mistake on my part and to disregard the message. I apologized for the confusion.

## 2023-09-21 ENCOUNTER — TELEPHONE (OUTPATIENT)
Dept: ENDOSCOPY | Facility: HOSPITAL | Age: 53
End: 2023-09-21
Payer: MEDICAID

## 2023-09-21 NOTE — PROGRESS NOTES
Left before visit  Answers submitted by the patient for this visit:  Review of Systems Questionnaire (Submitted on 9/19/2023)  appetite change : No  unexpected weight change: No  mouth sores: No  visual disturbance: No  cough: No  shortness of breath: No  chest pain: No  abdominal pain: No  diarrhea: No  frequency: No  back pain: No  rash: No  headaches: No  adenopathy: No  nervous/ anxious: No

## 2023-10-12 ENCOUNTER — OFFICE VISIT (OUTPATIENT)
Dept: HEMATOLOGY/ONCOLOGY | Facility: CLINIC | Age: 53
End: 2023-10-12
Payer: MEDICAID

## 2023-10-12 DIAGNOSIS — Z90.3 STATUS POST GASTRECTOMY: ICD-10-CM

## 2023-10-12 DIAGNOSIS — D50.8 IRON DEFICIENCY ANEMIA SECONDARY TO INADEQUATE DIETARY IRON INTAKE: Primary | ICD-10-CM

## 2023-10-12 PROCEDURE — 99213 PR OFFICE/OUTPT VISIT, EST, LEVL III, 20-29 MIN: ICD-10-PCS | Mod: 95,,, | Performed by: NURSE PRACTITIONER

## 2023-10-12 PROCEDURE — 99213 OFFICE O/P EST LOW 20 MIN: CPT | Mod: 95,,, | Performed by: NURSE PRACTITIONER

## 2023-10-12 NOTE — PROGRESS NOTES
"  FOLLOW UP TELEMEDICINE VISIT    Subjective:      Patient ID: Barbara Lozano is a 53 y.o. female.  MRN: [id number]  : 1970    TELEMEDICINE  The patient location is: home  The chief complaint leading to consultation is: Iron deficiency anemia  Visit type: Virtual visit with synchronous audio and video    Total time spent with patient: 15 minutes  20 minutes of total time spent on the encounter, which includes face to face time and non-face to face time preparing to see the patient (eg, review of tests), obtaining and/or reviewing separately obtained history, documenting clinical information in the electronic or other health record, independently interpreting results (not separately reported) and communicating results to the patient/family/caregiver, or care coordination (not separately reported).    Each patient to whom he or she provides medical services by telemedicine is:  (1) informed of the relationship between the physician and patient and the respective role of any other health care provider with respect to management of the patient; and (2) notified that he or she may decline to receive medical services by telemedicine and may withdraw from such care at any time.    History of Present Illness:   HPI  Miss Lozano is being treated for her iron deficiency anemia. She received 4 doses of IV Venofer 300 mg each from March 3, 2023 to 2023.    Ms. Lozano presents via telemed today for review of labs.  She continues to suffer with abdominal bloating "feels swollen" & discomfort.  She has a follow up appointment with her surgeon, Dr. Salazar ([facility]) in November.  She endorses constipation & is on several medications to counter this.  She has been bleeding with bowel movements.    Oncology History:  Oncology History    No history exists.      Past medical, surgical, family, and social history were reviewed today and there are no changes of note unless mentioned in HPI.   MEDS and " ALLERGIES were reviewed with patient and meds reconciled.     History:  Past Medical History:   Diagnosis Date    Acute gastric ulcer     chronic Constipation     Diaphragmatic hernia     Diarrhea     Diverticulum of esophagus     Dysphagia     Esophagitis, unspecified without bleeding     Gastric diverticulum     Gastritis     Gastroparesis     GERD (gastroesophageal reflux disease)     Hemorrhoids     Irritable bowel syndrome     Migraines     Osteoporosis       Past Surgical History:   Procedure Laterality Date    APPENDECTOMY      CHOLECYSTECTOMY      EGD, WITH BALLOON DILATION  12/15/2022    20mm    HYSTERECTOMY      robotic assisted near total gastrectomy  2022     Family History   Problem Relation Age of Onset    Hypertension Mother     Diabetes Mother     Arthritis Mother     Heart disease Father         MI  ~ 45 yrs old    Other Maternal Grandmother         had colostomy bag ? how many yr. had when she     Esophageal cancer Neg Hx     Stomach cancer Neg Hx     Colon cancer Neg Hx     Colon polyps Neg Hx     Pancreatic cancer Neg Hx     Liver cancer Neg Hx     Celiac disease Neg Hx     Crohn's disease Neg Hx     Irritable bowel syndrome Neg Hx     Ulcerative colitis Neg Hx     Rectal cancer Neg Hx       Social History     Tobacco Use    Smoking status: Never    Smokeless tobacco: Never   Substance and Sexual Activity    Alcohol use: No    Drug use: Never    Sexual activity: Yes     Partners: Male        ROS:  Review of Systems   Respiratory:  Negative for shortness of breath.    Cardiovascular:  Negative for chest pain.   Gastrointestinal:  Positive for abdominal pain and constipation.   Genitourinary:  Negative for frequency.   Skin:  Negative for rash.   Psychiatric/Behavioral:  The patient is not nervous/anxious.        Objective:   There were no vitals filed for this visit.  Wt Readings from Last 10 Encounters:   23 73.5 kg (162 lb 0.6 oz)   23 73.6 kg (162 lb 4.1 oz)    03/10/23 74.8 kg (165 lb)   03/08/23 74.8 kg (165 lb)   03/03/23 74.8 kg (165 lb)   02/28/23 73.8 kg (162 lb 11.2 oz)   04/12/21 68.2 kg (150 lb 5.7 oz)   12/21/20 68.7 kg (151 lb 7.3 oz)   11/11/15 63.5 kg (139 lb 15.9 oz)       Physical Examination:   Constitutional: she is alert, pleasant, and does not appear to be in any physical distress   Eyes: No obvious jaundice or conjunctivitis.  EOM are normal.   Pulmonary/Chest: No stridor noted. No excess chest muscle movement.  Neurological: she is alert and oriented to person, place, and time. No cranial nerve deficit.  Skin:  No rash noted. No erythema.   Psychiatric: she has a normal mood and affect. Speech and memory normal.       Laboratory Data:  Lab Results   Component Value Date    WBC 7.98 09/15/2023    HGB 10.6 (L) 09/15/2023    HCT 33.0 (L) 09/15/2023     09/15/2023    ALT 28 02/27/2023    AST 24 02/27/2023     02/27/2023    K 3.3 (L) 02/27/2023     04/12/2021    CREATININE 0.78 02/27/2023    BUN 20 02/27/2023    CO2 25 02/27/2023        Ferritin 20.0 - 300.0 ng/mL 255  291  17 Low          Vitamin B-12 210 - 950 pg/mL 409  342            Assessment/Plan:     Iron deficiency anemia due to malabsorption:  -I reviewed independently the patient laboratory and radiologic findings her medical records from [facility] also were reviewed.  I discussed her blood workup going back to 2021 with the patient was anemic with persistent leukocytosis and thrombocytosis.  Her iron studies are favoring iron-deficiency anemia.  Her % saturation is low.  Even though she has been taking oral iron for the last 2 months most likely she is not able to absorb the oral iron due to her recent gastrectomy and her oral intake of PPI.    -The patient  received IV Venofer at a dose of 300 mg weekly for 4 doses.  Her Hgb improved and ferritin  went from 17 -291  -She will be seen again in 3 months with repeat CBC and ferrtin  -Today:  10/12:  Hgb has remained @  10.6; Ferritin has remained stable @ 255; B12 improved from 342 to 409  Will reassess in 3 months after evaluation from surgeon of abdomen & hemorrhoids with CBC, Iron studies/ferritin prior.    Vitamin B12 deficiency :  -continue oral Vitamin B12 at a dose 1000 mcg daily.     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  -in view of her abdominal pain at the site of the surgery the patient was advised to follow up with her surgeon.  F/u scheduled for November; call for an earlier appt if symptoms worsen; have MD evaluate hemorrhoids.        Overweight BMI29.7  -she was advised to exercise maintain healthy lifestyle and to lose weight.       Med Onc Chart Routing      Follow up with physician    Follow up with ELDA 3 months. f/u in 3 months with CBC, Iron studies/ferritin prior   Infusion scheduling note    Injection scheduling note    Labs    Imaging    Pharmacy appointment    Other referrals                       Electronically signed by Jason Martines NP    Answers submitted by the patient for this visit:  Review of Systems Questionnaire (Submitted on 10/9/2023)  appetite change : No  unexpected weight change: No  mouth sores: No  visual disturbance: No  adenopathy: No

## 2023-10-30 ENCOUNTER — PATIENT MESSAGE (OUTPATIENT)
Dept: ENDOSCOPY | Facility: HOSPITAL | Age: 53
End: 2023-10-30
Payer: MEDICAID

## 2023-11-01 ENCOUNTER — TELEPHONE (OUTPATIENT)
Dept: ENDOSCOPY | Facility: HOSPITAL | Age: 53
End: 2023-11-01
Payer: MEDICAID

## 2023-11-06 ENCOUNTER — TELEPHONE (OUTPATIENT)
Dept: ENDOSCOPY | Facility: HOSPITAL | Age: 53
End: 2023-11-06
Payer: MEDICAID

## 2023-11-06 NOTE — TELEPHONE ENCOUNTER
Contacted the patient to schedule an endoscopy procedure(s) 11/6/23. The patient did not answer the call and left a voice message requesting a call back.

## 2023-11-08 ENCOUNTER — TELEPHONE (OUTPATIENT)
Dept: ENDOSCOPY | Facility: HOSPITAL | Age: 53
End: 2023-11-08
Payer: MEDICAID

## 2023-11-14 ENCOUNTER — TELEPHONE (OUTPATIENT)
Dept: ENDOSCOPY | Facility: HOSPITAL | Age: 53
End: 2023-11-14
Payer: MEDICAID

## 2023-11-20 ENCOUNTER — TELEPHONE (OUTPATIENT)
Dept: ENDOSCOPY | Facility: HOSPITAL | Age: 53
End: 2023-11-20
Payer: MEDICAID

## 2023-11-20 NOTE — TELEPHONE ENCOUNTER
Spoke to patient to reschedule procedure(s) Esophageal Manometry wit [facility]       Physician to perform procedure(s) Dr. RICARDO Locke   Date of Procedure (s) 1/11/24  Arrival Time 9:00 AM  Time of Procedure(s) 10:00 AM   Location of Procedure(s) [address]  Type of Rx Prep sent to patient: N/A  Instructions provided to patient via [facility]    Patient was informed on the following information and verbalized understanding. Screening questionnaire reviewed with patient and complete. If procedure requires anesthesia, a responsible adult needs to be present to accompany the patient home, patient cannot drive after receiving anesthesia. Appointment details are tentative, especially check-in time. Patient will receive a prep-op call 7 days prior to confirm check-in time for procedure. If applicable the patient should contact their pharmacy to verify Rx for procedure prep is ready for pick-up. Patient was advised to call the scheduling department at [phone] if pharmacy states no Rx is available. Patient was advised to call the endoscopy scheduling department if any questions or concerns arise.      [facility] Scheduling Department

## 2023-11-20 NOTE — TELEPHONE ENCOUNTER
Spoke to patient to reschedule procedure(s) Upper Endoscopy (EGD) with Endoflip       Physician to perform procedure(s) Dr. RICARDO Locke   Date of Procedure (s) 3/11/24  Arrival Time 11:30 AM  Time of Procedure(s) 12:30 PM   Location of Procedure(s) [address]  Type of Rx Prep sent to patient: N/A  Instructions provided to patient via [facility]    Patient was informed on the following information and verbalized understanding. Screening questionnaire reviewed with patient and complete. If procedure requires anesthesia, a responsible adult needs to be present to accompany the patient home, patient cannot drive after receiving anesthesia. Appointment details are tentative, especially check-in time. Patient will receive a prep-op call 7 days prior to confirm check-in time for procedure. If applicable the patient should contact their pharmacy to verify Rx for procedure prep is ready for pick-up. Patient was advised to call the scheduling department at [phone] if pharmacy states no Rx is available. Patient was advised to call the endoscopy scheduling department if any questions or concerns arise.      [facility] Scheduling [facility]

## 2024-01-05 ENCOUNTER — TELEPHONE (OUTPATIENT)
Dept: ENDOSCOPY | Facility: HOSPITAL | Age: 54
End: 2024-01-05
Payer: MEDICAID

## 2024-01-08 ENCOUNTER — TELEPHONE (OUTPATIENT)
Dept: ENDOSCOPY | Facility: HOSPITAL | Age: 54
End: 2024-01-08
Payer: MEDICAID

## 2024-01-12 ENCOUNTER — TELEPHONE (OUTPATIENT)
Dept: HEMATOLOGY/ONCOLOGY | Facility: CLINIC | Age: 54
End: 2024-01-12
Payer: MEDICAID

## 2024-01-12 NOTE — TELEPHONE ENCOUNTER
Returned pt phone call, and rescheduled appt as requested to a virtual visit on 1/17.  Pt had no further questions or concerns.    ----- Message from Jaelyn Kerr, Patient Care Assistant sent at 1/12/2024  9:27 AM CST -----  Type: Needs Medical Advice  Who Called:  hitesh  Best Call Back Number: [phone]    Additional Information: hitesh states she would like to change her 1/17 in person to a virtual , please call to further discuss. Thank you .

## 2024-01-16 ENCOUNTER — TELEPHONE (OUTPATIENT)
Dept: HEMATOLOGY/ONCOLOGY | Facility: CLINIC | Age: 54
End: 2024-01-16
Payer: MEDICAID

## 2024-01-16 NOTE — TELEPHONE ENCOUNTER
Spoke with the pt and got the pt rescheduled with labs and md appt. Pt verbalized and understanding.  ----- Message from Jaelyn Kerr, Patient Care Assistant sent at 1/16/2024  9:53 AM CST -----  Regarding: selvin  Type: Needs Medical Advice  Who Called:  hitesh  Best Call Back Number: [phone]    Additional Information: .hitesh stats she needs to reschedule her labs and selvin appointment please call to further discuss, thank you,

## 2024-01-25 ENCOUNTER — LAB VISIT (OUTPATIENT)
Dept: LAB | Facility: HOSPITAL | Age: 54
End: 2024-01-25
Attending: NURSE PRACTITIONER
Payer: MEDICAID

## 2024-01-25 DIAGNOSIS — D50.8 IRON DEFICIENCY ANEMIA SECONDARY TO INADEQUATE DIETARY IRON INTAKE: ICD-10-CM

## 2024-01-25 LAB
BASOPHILS # BLD AUTO: 0.1 K/UL (ref 0–0.2)
BASOPHILS NFR BLD: 0.8 % (ref 0–1.9)
DIFFERENTIAL METHOD BLD: ABNORMAL
EOSINOPHIL # BLD AUTO: 0.2 K/UL (ref 0–0.5)
EOSINOPHIL NFR BLD: 1.9 % (ref 0–8)
ERYTHROCYTE [DISTWIDTH] IN BLOOD BY AUTOMATED COUNT: 12.6 % (ref 11.5–14.5)
FERRITIN SERPL-MCNC: 226 NG/ML (ref 20–300)
HCT VFR BLD AUTO: 34.9 % (ref 37–48.5)
HGB BLD-MCNC: 11.9 G/DL (ref 12–16)
IMM GRANULOCYTES # BLD AUTO: 0.05 K/UL (ref 0–0.04)
IMM GRANULOCYTES NFR BLD AUTO: 0.4 % (ref 0–0.5)
IRON SERPL-MCNC: 57 UG/DL (ref 30–160)
LYMPHOCYTES # BLD AUTO: 2.5 K/UL (ref 1–4.8)
LYMPHOCYTES NFR BLD: 20.3 % (ref 18–48)
MCH RBC QN AUTO: 29.3 PG (ref 27–31)
MCHC RBC AUTO-ENTMCNC: 34.1 G/DL (ref 32–36)
MCV RBC AUTO: 86 FL (ref 82–98)
MONOCYTES # BLD AUTO: 1 K/UL (ref 0.3–1)
MONOCYTES NFR BLD: 8.3 % (ref 4–15)
NEUTROPHILS # BLD AUTO: 8.3 K/UL (ref 1.8–7.7)
NEUTROPHILS NFR BLD: 68.3 % (ref 38–73)
NRBC BLD-RTO: 0 /100 WBC
PLATELET # BLD AUTO: 410 K/UL (ref 150–450)
PMV BLD AUTO: 9.9 FL (ref 9.2–12.9)
RBC # BLD AUTO: 4.06 M/UL (ref 4–5.4)
SATURATED IRON: 19 % (ref 20–50)
TOTAL IRON BINDING CAPACITY: 295 UG/DL (ref 250–450)
TRANSFERRIN SERPL-MCNC: 199 MG/DL (ref 200–375)
WBC # BLD AUTO: 12.15 K/UL (ref 3.9–12.7)

## 2024-01-25 PROCEDURE — 36415 COLL VENOUS BLD VENIPUNCTURE: CPT | Mod: PN | Performed by: NURSE PRACTITIONER

## 2024-01-25 PROCEDURE — 83540 ASSAY OF IRON: CPT | Performed by: NURSE PRACTITIONER

## 2024-01-25 PROCEDURE — 85025 COMPLETE CBC W/AUTO DIFF WBC: CPT | Mod: PN | Performed by: NURSE PRACTITIONER

## 2024-01-25 PROCEDURE — 82728 ASSAY OF FERRITIN: CPT | Performed by: NURSE PRACTITIONER

## 2024-01-29 ENCOUNTER — TELEPHONE (OUTPATIENT)
Dept: ENDOSCOPY | Facility: HOSPITAL | Age: 54
End: 2024-01-29
Payer: MEDICAID

## 2024-01-29 ENCOUNTER — OFFICE VISIT (OUTPATIENT)
Dept: HEMATOLOGY/ONCOLOGY | Facility: CLINIC | Age: 54
End: 2024-01-29
Payer: MEDICAID

## 2024-01-29 DIAGNOSIS — D50.8 IRON DEFICIENCY ANEMIA SECONDARY TO INADEQUATE DIETARY IRON INTAKE: Primary | ICD-10-CM

## 2024-01-29 DIAGNOSIS — Z90.3 HISTORY OF GASTRECTOMY: ICD-10-CM

## 2024-01-29 PROCEDURE — 99212 OFFICE O/P EST SF 10 MIN: CPT | Mod: 95,,, | Performed by: NURSE PRACTITIONER

## 2024-01-29 NOTE — TELEPHONE ENCOUNTER
Rec'd message that pt wanted a sooner EGD/Endoflip. Called pt. No answer, left voicemail for pt to call the Endoscopy Scheduling department back as needed for assistance.

## 2024-01-29 NOTE — PROGRESS NOTES
FOLLOW UP TELEMEDICINE VISIT    Subjective:      Patient ID: Barbara Lozano is a 53 y.o. female.  MRN: [id number]  : 1970    TELEMEDICINE  The patient location is: home  The chief complaint leading to consultation is: Iron deficiency anemia  Visit type: Virtual visit with synchronous audio and video    Total time spent with patient: 15 minutes of total time spent on the encounter, which includes face to face time and non-face to face time preparing to see the patient (eg, review of tests), obtaining and/or reviewing separately obtained history, documenting clinical information in the electronic or other health record, independently interpreting results (not separately reported) and communicating results to the patient/family/caregiver, or care coordination (not separately reported).    Each patient to whom he or she provides medical services by telemedicine is:  (1) informed of the relationship between the physician and patient and the respective role of any other health care provider with respect to management of the patient; and (2) notified that he or she may decline to receive medical services by telemedicine and may withdraw from such care at any time.    History of Present Illness:   HPI  Ms Lozano presents for interval evaluation for a dx of BEREKET. She received 4 doses of IV Venofer 300 mg each from March 3, 2023 to 2023.      Today:  24  She presents via telemed today for review of labs.  Since our last appt 10/12/23, she followed up with her surgeon, Dr. Salazar in November - doing well.  She endorses constipation r/t IBS - she remains on Reglan, Linzess & Dicyclomine; no hematochezia with bowel movements. & is on several medications to counter this.  Ms. Lozano endorses increased difficulties with GERD & has occasional reflux at night.  She denies any fatigue, palpitations, bleeding, cold extremities, pica, HA's, blurred vision, etc.         Past medical, surgical, family, and  social history were reviewed today and there are no changes of note unless mentioned in HPI.   MEDS and ALLERGIES were reviewed with patient and meds reconciled.     History:  Past Medical History:   Diagnosis Date    Acute gastric ulcer     chronic Constipation     Diaphragmatic hernia     Diarrhea     Diverticulum of esophagus     Dysphagia     Esophagitis, unspecified without bleeding     Gastric diverticulum     Gastritis     Gastroparesis     GERD (gastroesophageal reflux disease)     Hemorrhoids     Irritable bowel syndrome     Migraines     Osteoporosis       Past Surgical History:   Procedure Laterality Date    APPENDECTOMY      CHOLECYSTECTOMY      EGD, WITH BALLOON DILATION  12/15/2022    20mm    HYSTERECTOMY      robotic assisted near total gastrectomy  2022     Family History   Problem Relation Age of Onset    Hypertension Mother     Diabetes Mother     Arthritis Mother     Heart disease Father         MI  ~ 45 yrs old    Other Maternal Grandmother         had colostomy bag ? how many yr. had when she     Esophageal cancer Neg Hx     Stomach cancer Neg Hx     Colon cancer Neg Hx     Colon polyps Neg Hx     Pancreatic cancer Neg Hx     Liver cancer Neg Hx     Celiac disease Neg Hx     Crohn's disease Neg Hx     Irritable bowel syndrome Neg Hx     Ulcerative colitis Neg Hx     Rectal cancer Neg Hx       Social History     Tobacco Use    Smoking status: Never    Smokeless tobacco: Never   Substance and Sexual Activity    Alcohol use: No    Drug use: Never    Sexual activity: Yes     Partners: Male        ROS:  Review of Systems   Respiratory:  Negative for cough and shortness of breath.    Cardiovascular:  Negative for chest pain.   Gastrointestinal:  Positive for abdominal pain and constipation. Negative for diarrhea.   Genitourinary:  Negative for frequency.   Musculoskeletal:  Negative for back pain.   Skin:  Negative for rash.   Neurological:  Negative for headaches.    Psychiatric/Behavioral:  The patient is not nervous/anxious.        Objective:   There were no vitals filed for this visit.  Wt Readings from Last 10 Encounters:   06/01/23 73.5 kg (162 lb 0.6 oz)   04/14/23 73.6 kg (162 lb 4.1 oz)   03/10/23 74.8 kg (165 lb)   03/08/23 74.8 kg (165 lb)   03/03/23 74.8 kg (165 lb)   02/28/23 73.8 kg (162 lb 11.2 oz)   04/12/21 68.2 kg (150 lb 5.7 oz)   12/21/20 68.7 kg (151 lb 7.3 oz)   11/11/15 63.5 kg (139 lb 15.9 oz)       Physical Examination:   Constitutional: she is alert, pleasant, and does not appear to be in any physical distress   Eyes: No obvious jaundice or conjunctivitis.  EOM are normal.   Pulmonary/Chest: No stridor noted. No excess chest muscle movement.  Neurological: she is alert and oriented to person, place, and time. No cranial nerve deficit.  Skin:  No rash noted. No erythema.   Psychiatric: she has a normal mood and affect. Speech and memory normal.       Laboratory Data:  Lab Results   Component Value Date    WBC 12.15 01/25/2024    HGB 11.9 (L) 01/25/2024    HCT 34.9 (L) 01/25/2024     01/25/2024    ALT 28 02/27/2023    AST 24 02/27/2023     02/27/2023    K 3.3 (L) 02/27/2023     04/12/2021    CREATININE 0.78 02/27/2023    BUN 20 02/27/2023    CO2 25 02/27/2023      Hgb improved from 10.6 to 11.9  Lab Results   Component Value Date    IRON 57 01/25/2024    TRANSFERRIN 199 (L) 01/25/2024    TIBC 295 01/25/2024    FESATURATED 19 (L) 01/25/2024      Lab Results   Component Value Date    FERRITIN 226 01/25/2024        Assessment/Plan:     Iron deficiency anemia due to malabsorption:  -I reviewed independently the patient laboratory and radiologic findings her medical records from [facility] also were reviewed.  I discussed her blood workup going back to 2021 with the patient was anemic with persistent leukocytosis and thrombocytosis.  Her iron studies are favoring iron-deficiency anemia.  Her % saturation is low.  Even though she has  been taking oral iron for the last 2 months most likely she is not able to absorb the oral iron due to her recent gastrectomy and her oral intake of PPI.    -The patient  received IV Venofer at a dose of 300 mg weekly for 4 doses.  Her Hgb improved and ferritin  went from 17 -291  -She will be seen again in 3 months with repeat CBC and ferrtin  - 10/12:  Hgb has remained @ 10.6; Ferritin has remained stable @ 255; B12 improved from 342 to 409  Will reassess in 3 months after evaluation from surgeon of abdomen & hemorrhoids with CBC, Iron studies/ferritin prior.  -01/29/24:  Stable, asymptomatic; improved labs; will reevaluate in 6 months with CBC, Iron studies/ferritin/Vitamin b12/folate prior.  Call for any worsening s/s of anemia prior.    Vitamin B12 deficiency :  -continue oral Vitamin B12 at a dose 1000 mcg daily.   -recheck levels in 6 months    Hemorrhoids - bleeding with BM's due to constipation    Constipation - Following regimen from surgeon     Status post gastrectomy:  -most likely the patient has malabsorption of multiple micro element.  -in view of her abdominal pain at the site of the surgery the patient was advised to follow up with her surgeon.  Call for an earlier appt if symptoms worsen; have MD evaluate hemorrhoids.        Overweight BMI29.7  -she was advised to exercise maintain healthy lifestyle and to lose weight.       Med Onc Chart Routing      Follow up with physician    Follow up with ELDA 6 months. VV in 6 months with labs prior;  CBC, Iron studies/ferritin/folate/B12   Infusion scheduling note    Injection scheduling note    Labs    Imaging    Pharmacy appointment    Other referrals                       Electronically signed by Jason Martines NP         Answers submitted by the patient for this visit:  Review of Systems Questionnaire (Submitted on 1/23/2024)  appetite change : No  unexpected weight change: No  mouth sores: No  visual disturbance: No  adenopathy: No

## 2024-01-30 ENCOUNTER — TELEPHONE (OUTPATIENT)
Dept: ENDOSCOPY | Facility: HOSPITAL | Age: 54
End: 2024-01-30
Payer: MEDICAID

## 2024-01-30 NOTE — TELEPHONE ENCOUNTER
Ma spoke with pt in regards to rescheduling her procedure , patient is looking for a date in February 2024 or March 2024. No openings with Dr. Mosley until April 2024 . Patient states she wants procedure cxl and she will make an appointment with dr mosley to see if the test is needed. Pt has rescheduled procedure multiple times starting 3/8/23

## 2024-02-01 ENCOUNTER — TELEPHONE (OUTPATIENT)
Dept: GASTROENTEROLOGY | Facility: CLINIC | Age: 54
End: 2024-02-01
Payer: MEDICAID

## 2024-02-01 NOTE — TELEPHONE ENCOUNTER
----- Message from Velma Bland LPN sent at 2/1/2024 10:05 AM CST -----  Regarding: RE: appointment  I sent message to Dr Locke   ----- Message -----  From: Felicita Franklin MA  Sent: 2/1/2024   9:59 AM CST  To: Velma Bland LPN  Subject: appointment                                      Hi, one of the endoscopy schedulers stated they spoke with patient and she is needing to schedule a colonoscopy but was told she was needing to see  first.  Formally a patient of .   Is this something you can help with?    Thank you!!   Martha

## 2024-02-19 ENCOUNTER — TELEPHONE (OUTPATIENT)
Dept: GASTROENTEROLOGY | Facility: CLINIC | Age: 54
End: 2024-02-19
Payer: MEDICAID

## 2024-02-19 NOTE — TELEPHONE ENCOUNTER
Called and spoke with the patient, patient wanted to know if her insurance was taken here at the [facility] clinic, insurance not accepted at this time for new patients per appointment schedule, Patient verbalized understanding of this, patient notified that [facility] on [facility] and out [facility] accepts her insurance at this time.  Patient verbalized understanding of this.

## 2024-02-19 NOTE — TELEPHONE ENCOUNTER
----- Message from Rossy Jose sent at 2/19/2024  8:46 AM CST -----  Type: Needs Medical Advice  Who Called:  pt  Symptoms (please be specific):  gastroparesis  How long has patient had these symptoms:  april  Pharmacy name and phone #:    Best Call Back Number: [phone]    Additional Information: Pt is calling the office to find out which provider would be best for her to see  Please call back to advise.  Thanks

## 2024-03-28 ENCOUNTER — OFFICE VISIT (OUTPATIENT)
Dept: GASTROENTEROLOGY | Facility: CLINIC | Age: 54
End: 2024-03-28
Payer: MEDICAID

## 2024-03-28 ENCOUNTER — TELEPHONE (OUTPATIENT)
Dept: ENDOSCOPY | Facility: HOSPITAL | Age: 54
End: 2024-03-28
Payer: MEDICAID

## 2024-03-28 DIAGNOSIS — R13.10 DYSPHAGIA, UNSPECIFIED TYPE: Primary | ICD-10-CM

## 2024-03-28 PROCEDURE — 99214 OFFICE O/P EST MOD 30 MIN: CPT | Mod: 95,,, | Performed by: INTERNAL MEDICINE

## 2024-03-28 NOTE — PROGRESS NOTES
The patient location is: [place]  The chief complaint leading to consultation is: Dysphagia    Visit type: audiovisual    Face to Face time with patient: 8 minutes  15 minutes of total time spent on the encounter, which includes face to face time and non-face to face time preparing to see the patient (eg, review of tests), Obtaining and/or reviewing separately obtained history, Documenting clinical information in the electronic or other health record, Independently interpreting results (not separately reported) and communicating results to the patient/family/caregiver, or Care coordination (not separately reported).         Each patient to whom he or she provides medical services by telemedicine is:  (1) informed of the relationship between the physician and patient and the respective role of any other health care provider with respect to management of the patient; and (2) notified that he or she may decline to receive medical services by telemedicine and may withdraw from such care at any time.    Notes:   [facility]      CC: dysphagia    HPI 53 y.o. female with past medical history of gastroparesis s/p partial gastrectomy on Reglan 10mg 4 times per day,  GERD on nexium 40mg daily, IBS c on linzess and motegrity who presents with chronic, daily, solid food dysphagia with associated odynophagia, regurgitation and some weight loss but she attributes this to her gastroparesis/gastrectomy.    Past Medical History  Past Medical History:   Diagnosis Date    Acute gastric ulcer     chronic Constipation     Diaphragmatic hernia     Diarrhea     Diverticulum of esophagus     Dysphagia     Esophagitis, unspecified without bleeding     Gastric diverticulum     Gastritis     Gastroparesis     GERD (gastroesophageal reflux disease)     Hemorrhoids     Irritable bowel syndrome     Migraines     Osteoporosis          Physical Examination  There were no vitals taken for this visit.  General appearance: alert,  cooperative, no distress  HENT: Normocephalic, atraumatic, neck symmetrical, no nasal discharge   Neurologic: Alert and oriented X 3, moving all four extremities, intact sensation to light touch    Labs:  Lab Results   Component Value Date    WBC 12.15 01/25/2024    HGB 11.9 (L) 01/25/2024    HCT 34.9 (L) 01/25/2024    MCV 86 01/25/2024     01/25/2024         CMP  Sodium   Date Value Ref Range Status   02/27/2023 142 136 - 144 mmol/L Final   04/12/2021 142 136 - 145 mmol/L Final     Potassium   Date Value Ref Range Status   02/27/2023 3.3 (L) 3.6 - 5.1 mmol/L Final   04/12/2021 3.8 3.5 - 5.1 mmol/L Final     Chloride   Date Value Ref Range Status   04/12/2021 107 95 - 110 mmol/L Final     CO2   Date Value Ref Range Status   02/27/2023 25 22 - 32 mmol/L Final   04/12/2021 27 22 - 31 mmol/L Final     Glucose   Date Value Ref Range Status   04/12/2021 102 70 - 110 mg/dL Final     Comment:     The ADA recommends the following guidelines for fasting glucose:    Normal:       less than 100 mg/dL    Prediabetes:  100 mg/dL to 125 mg/dL    Diabetes:     126 mg/dL or higher       BUN   Date Value Ref Range Status   04/12/2021 14 7 - 18 mg/dL Final     Blood Urea Nitrogen   Date Value Ref Range Status   02/27/2023 20 8 - 20 mg/dL Final     Creatinine   Date Value Ref Range Status   02/27/2023 0.78 0.60 - 1.10 mg/dL Final   04/12/2021 0.70 0.50 - 1.40 mg/dL Final     Calcium   Date Value Ref Range Status   02/27/2023 9.3 8.9 - 10.3 mg/dL Final   04/12/2021 9.5 8.4 - 10.2 mg/dL Final     Total Protein   Date Value Ref Range Status   02/27/2023 7.0 6.1 - 7.9 g/dL Final   04/12/2021 7.5 6.0 - 8.4 g/dL Final     Albumin   Date Value Ref Range Status   02/27/2023 4.3 3.5 - 4.8 g/dL Final   04/12/2021 4.6 3.5 - 5.2 g/dL Final     Total Bilirubin   Date Value Ref Range Status   02/27/2023 0.3 (L) 0.4 - 2.0 mg/dL Final   04/12/2021 0.4 0.2 - 1.3 mg/dL Final     Alkaline Phosphatase   Date Value Ref Range Status   02/27/2023 122  (H) 28 - 116 U/L Final   04/12/2021 119 38 - 145 U/L Final     AST ([place])   Date Value Ref Range Status   11/11/2015 22 14 - 36 U/L Final     AST   Date Value Ref Range Status   02/27/2023 24 10 - 34 U/L Final   04/12/2021 26 14 - 36 U/L Final     ALT   Date Value Ref Range Status   02/27/2023 28 5 - 41 U/L Final   04/12/2021 14 0 - 35 U/L Final     Anion Gap   Date Value Ref Range Status   02/27/2023 9 7 - 16 mmol/L Final   04/12/2021 8 8 - 16 mmol/L Final         Assessment:   The patient is a 54 yo female with IBS-C, gastroparesis s/p partial gastrectomy, GERD who presents with chronic, daily, solid food dysphagia with associated odynophagia, chest pain and regurgitation.    Plan:  -Schedule EGD with Endoflip, manometry catheter placement during EGD, followed by esophageal manometry with dysphagia and rumination protocol.    Nicole Locke MD

## 2024-03-28 NOTE — TELEPHONE ENCOUNTER
----- Message from Nicole Locke MD sent at 3/28/2024 11:52 AM CDT -----  Regarding: EGD with Endoflip, manometry catheter placement, followed by esophageal manometry with dysphagia and rumination protocol  [facility] PROCEDURE ORDERS    CLEARANCE FOR PROCEDURES:  [ ] Not needed       PROCEDURES    [ ] EGD with EndoFlip   [ ] Esophageal manometry with impedance - dysphagia and rumination      Does manometry need to be placed endoscopically:   Yes    FLOOR:    [ ] 2nd Floor    Reason for 2nd Floor:   [ ] Gastroparesis       PREP  [ ] Full liquid diet 3 days       MEDICATIONS      Pacemaker/defibrillator:  No    Motility Studies (esophageal manometry/anorectal manometry)  Hold narcotics x 1 days if able   Hold TCA x 1 days if able  Propofol/lidocaine only during sedation.  Discuss with Dr. Gonzales if additional sedation needed.   Hold baclofen for thee days (at least one day) if able   Hold muscle relaxants x 1 day if able     Anticoagulation/antiplt agents:   No    ORDER OF TESTING:  Day 1: EGD with Endoflip, esophageal manometry catheter placed during EGD followed by esophageal manometry with dysphagia and rumination protocol        SCHEDULING PRIORITY  Routine    URGENCY     [x] Medically NOT Urgent      DIAGNOSIS   [ ] Dysphagia        PHYSICIAN  [ ]  Ok with Dr. Locke

## 2024-04-05 ENCOUNTER — TELEPHONE (OUTPATIENT)
Dept: ENDOSCOPY | Facility: HOSPITAL | Age: 54
End: 2024-04-05
Payer: MEDICAID

## 2024-04-05 NOTE — TELEPHONE ENCOUNTER
Contacted the patient to schedule an endoscopy procedure(s) EGD/Endoflip and Esophageal Manometry. The line was busy, voicemail available. Message sent to patient portal requesting a call back.

## 2024-04-15 ENCOUNTER — TELEPHONE (OUTPATIENT)
Dept: ENDOSCOPY | Facility: HOSPITAL | Age: 54
End: 2024-04-15
Payer: MEDICAID

## 2024-04-15 NOTE — TELEPHONE ENCOUNTER
Contacted the patient to schedule an endoscopy procedure(s) 4/15/24. The patient did not answer the call and left a voice message requesting a call back on her emergency contact number

## 2024-04-16 ENCOUNTER — TELEPHONE (OUTPATIENT)
Dept: ENDOSCOPY | Facility: HOSPITAL | Age: 54
End: 2024-04-16
Payer: MEDICAID

## 2024-04-16 VITALS — HEIGHT: 62 IN | WEIGHT: 162 LBS | BODY MASS INDEX: 29.81 KG/M2

## 2024-04-16 DIAGNOSIS — R13.10 DYSPHAGIA, UNSPECIFIED TYPE: Primary | ICD-10-CM

## 2024-04-16 NOTE — TELEPHONE ENCOUNTER
Spoke to patient to schedule procedure(s) Upper Endoscopy (EGD)       Physician to perform procedure(s) Dr. RICARDO Locke   Date of Procedure (s) 7/1/24  Arrival Time 7:15 AM  Time of Procedure(s) 8:15 AM   Location of Procedure(s) [address]  Type of Rx Prep sent to patient: N/A  Instructions provided to patient via [facility]    Patient was informed on the following information and verbalized understanding. Screening questionnaire reviewed with patient and complete. If procedure requires anesthesia, a responsible adult needs to be present to accompany the patient home, patient cannot drive after receiving anesthesia. Appointment details are tentative, especially check-in time. Patient will receive a prep-op call 7 days prior to confirm check-in time for procedure. If applicable the patient should contact their pharmacy to verify Rx for procedure prep is ready for pick-up. Patient was advised to call the scheduling department at [phone] if pharmacy states no Rx is available. Patient was advised to call the endoscopy scheduling department if any questions or concerns arise.      [facility] Scheduling [facility]

## 2024-06-21 ENCOUNTER — TELEPHONE (OUTPATIENT)
Dept: ENDOSCOPY | Facility: HOSPITAL | Age: 54
End: 2024-06-21
Payer: MEDICAID

## 2024-06-21 NOTE — TELEPHONE ENCOUNTER
--- Message -----   From: Yesenia Delgadillo MA   Sent: 6/20/2024   9:16 AM CDT   To: [facility] Patients   Subject: FW: Appointment Request                            Please review/advise, thank you.      Patient is requesting to reschedule her procedure on 7/1 with Dr. Locke.   ----- Message -----   From: Barbara Lozano   Sent: 6/20/2024   9:10 AM CDT   To: [facility] Gastroenterology Clinical Support   Subject: Appointment Request                                Appointment Request From: Barbara Lozano      With Provider: Nicole Locke [facility]      Preferred Date Range: 8/14/2024 - 8/14/2024      Preferred Times: Any Time      Reason for visit: I need to reschedule my appointment      Comments:   Need to reschedule my test And cancel the one for July 1

## 2024-06-21 NOTE — TELEPHONE ENCOUNTER
Received patient's call to reschedule endoscopy procedures. Patient provided new phone number to put in chart. Message sent to .  states she will reach out to patient to schedule EGD/Endoflip/manometry.

## 2024-06-28 ENCOUNTER — TELEPHONE (OUTPATIENT)
Dept: ENDOSCOPY | Facility: HOSPITAL | Age: 54
End: 2024-06-28
Payer: MEDICAID

## 2024-06-28 NOTE — TELEPHONE ENCOUNTER
Ma spoke with pt , Pt was offer 9/15 for procedure , Pt was ok with date but wanted earlier time , nothing available , pt was informed she will be called if they had a cancellation or when the October schedule opens

## 2024-07-11 ENCOUNTER — TELEPHONE (OUTPATIENT)
Dept: ENDOSCOPY | Facility: HOSPITAL | Age: 54
End: 2024-07-11
Payer: MEDICAID

## 2024-07-11 VITALS — WEIGHT: 150 LBS | BODY MASS INDEX: 27.6 KG/M2 | HEIGHT: 62 IN

## 2024-07-11 NOTE — TELEPHONE ENCOUNTER
Ma received message to schedule pt for procedures with dr Locke, Pt was offered 9/9 with dr osorio and she declined , pt states she wants a morning time , Pt was informed that the schedule for 10/2024 still was not open , and she will be called once schedule is open

## 2024-07-11 NOTE — TELEPHONE ENCOUNTER
Message from Nicole Locke MD sent at 3/28/2024 11:52 AM CDT -----  Regarding: EGD with Endoflip, manometry catheter placement, followed by esophageal manometry with dysphagia and rumination protocol  [facility] PROCEDURE ORDERS     CLEARANCE FOR PROCEDURES:  [ ] Not needed         PROCEDURES     [ ] EGD with EndoFlip   [ ] Esophageal manometry with impedance - dysphagia and rumination        Does manometry need to be placed endoscopically:   Yes     FLOOR:     [ ] 2nd Floor     Reason for 2nd Floor:   [ ] Gastroparesis         PREP  [ ] Full liquid diet 3 days         MEDICATIONS        Pacemaker/defibrillator:  No     Motility Studies (esophageal manometry/anorectal manometry)  Hold narcotics x 1 days if able   Hold TCA x 1 days if able  Propofol/lidocaine only during sedation.  Discuss with Dr. Gonzales if additional sedation needed.   Hold baclofen for thee days (at least one day) if able   Hold muscle relaxants x 1 day if able      Anticoagulation/antiplt agents:   No     ORDER OF TESTING:  Day 1: EGD with Endoflip, esophageal manometry catheter placed during EGD followed by esophageal manometry with dysphagia and rumination protocol           SCHEDULING PRIORITY  Routine     URGENCY      [x] Medically NOT Urgent      DIAGNOSIS   [ ] Dysphagia         PHYSICIAN  [ ]  Ok with Dr. Locke

## 2024-07-11 NOTE — TELEPHONE ENCOUNTER
maria guadalupe       ----- Message -----   From: Nicole Locke MD   Sent: 7/11/2024  10:05 AM CDT   To: Kervin Dumont MA   Subject: FW: Appointment Request                            Mei Galeana,      She needs EGD with Endoflip, manometry catheter placement during EGD, followed by esophageal manometry with dysphagia and rumination protocol.   ----- Message -----   From: Kervin Dumont MA   Sent: 7/11/2024   9:13 AM CDT   To: Nicole Locke MD   Subject: FW: Appointment Request                            Dr. Locke,      Does patient need egd with endo flip and manometry or egd with manometry? Please advise   ----- Message -----   From: Alejandra Li   Sent: 7/11/2024   8:39 AM CDT   To: Kervin Dumont MA   Subject: FW: Appointment Request                               ----- Message -----   From: Velma Bland LPN   Sent: 7/10/2024   2:11 PM CDT   To: [facility]   Subject: FW: Appointment Request                               ----- Message -----   From: Barbara Lozano   Sent: 7/10/2024  12:46 PM CDT   To: [facility] Clinical Support   Subject: Appointment Request                                Appointment Request From: Barbara Lozano      With Provider: Nicole Locke [[facility]]      Preferred Date Range: Any      Preferred Times: Any Time      Reason for visit: Schedule test      Comments:   Trying to schedule my two test

## 2024-07-18 ENCOUNTER — TELEPHONE (OUTPATIENT)
Dept: ENDOSCOPY | Facility: HOSPITAL | Age: 54
End: 2024-07-18
Payer: MEDICAID

## 2024-07-18 NOTE — TELEPHONE ENCOUNTER
Contacted the patient to schedule an endoscopy procedure(s) 7/18/24. The patient did not answer the call and left a voice message requesting a call back.

## 2024-07-29 ENCOUNTER — LAB VISIT (OUTPATIENT)
Dept: LAB | Facility: HOSPITAL | Age: 54
End: 2024-07-29
Attending: NURSE PRACTITIONER
Payer: MEDICAID

## 2024-07-29 DIAGNOSIS — D50.8 IRON DEFICIENCY ANEMIA SECONDARY TO INADEQUATE DIETARY IRON INTAKE: ICD-10-CM

## 2024-07-29 DIAGNOSIS — Z90.3 HISTORY OF GASTRECTOMY: ICD-10-CM

## 2024-07-29 LAB
BASOPHILS # BLD AUTO: 0.08 K/UL (ref 0–0.2)
BASOPHILS NFR BLD: 1 % (ref 0–1.9)
DIFFERENTIAL METHOD BLD: ABNORMAL
EOSINOPHIL # BLD AUTO: 0.2 K/UL (ref 0–0.5)
EOSINOPHIL NFR BLD: 2.3 % (ref 0–8)
ERYTHROCYTE [DISTWIDTH] IN BLOOD BY AUTOMATED COUNT: 12.1 % (ref 11.5–14.5)
FERRITIN SERPL-MCNC: 158 NG/ML (ref 20–300)
FOLATE SERPL-MCNC: 5.6 NG/ML (ref 4–24)
HCT VFR BLD AUTO: 30.7 % (ref 37–48.5)
HGB BLD-MCNC: 10.5 G/DL (ref 12–16)
IMM GRANULOCYTES # BLD AUTO: 0.07 K/UL (ref 0–0.04)
IMM GRANULOCYTES NFR BLD AUTO: 0.8 % (ref 0–0.5)
IRON SERPL-MCNC: 69 UG/DL (ref 30–160)
LYMPHOCYTES # BLD AUTO: 2.2 K/UL (ref 1–4.8)
LYMPHOCYTES NFR BLD: 26.8 % (ref 18–48)
MCH RBC QN AUTO: 29.2 PG (ref 27–31)
MCHC RBC AUTO-ENTMCNC: 34.2 G/DL (ref 32–36)
MCV RBC AUTO: 86 FL (ref 82–98)
MONOCYTES # BLD AUTO: 0.6 K/UL (ref 0.3–1)
MONOCYTES NFR BLD: 7.1 % (ref 4–15)
NEUTROPHILS # BLD AUTO: 5.2 K/UL (ref 1.8–7.7)
NEUTROPHILS NFR BLD: 62 % (ref 38–73)
NRBC BLD-RTO: 0 /100 WBC
PLATELET # BLD AUTO: 394 K/UL (ref 150–450)
PMV BLD AUTO: 9.8 FL (ref 9.2–12.9)
RBC # BLD AUTO: 3.59 M/UL (ref 4–5.4)
SATURATED IRON: 28 % (ref 20–50)
TOTAL IRON BINDING CAPACITY: 246 UG/DL (ref 250–450)
TRANSFERRIN SERPL-MCNC: 166 MG/DL (ref 200–375)
VIT B12 SERPL-MCNC: 210 PG/ML (ref 210–950)
WBC # BLD AUTO: 8.36 K/UL (ref 3.9–12.7)

## 2024-07-29 PROCEDURE — 82607 VITAMIN B-12: CPT | Performed by: NURSE PRACTITIONER

## 2024-07-29 PROCEDURE — 36415 COLL VENOUS BLD VENIPUNCTURE: CPT | Mod: PN | Performed by: NURSE PRACTITIONER

## 2024-07-29 PROCEDURE — 82728 ASSAY OF FERRITIN: CPT | Performed by: NURSE PRACTITIONER

## 2024-07-29 PROCEDURE — 84466 ASSAY OF TRANSFERRIN: CPT | Performed by: NURSE PRACTITIONER

## 2024-07-29 PROCEDURE — 85025 COMPLETE CBC W/AUTO DIFF WBC: CPT | Mod: PN | Performed by: NURSE PRACTITIONER

## 2024-07-29 PROCEDURE — 82746 ASSAY OF FOLIC ACID SERUM: CPT | Performed by: NURSE PRACTITIONER

## 2024-07-31 ENCOUNTER — OFFICE VISIT (OUTPATIENT)
Dept: HEMATOLOGY/ONCOLOGY | Facility: CLINIC | Age: 54
End: 2024-07-31
Payer: MEDICAID

## 2024-07-31 ENCOUNTER — TELEPHONE (OUTPATIENT)
Dept: ENDOSCOPY | Facility: HOSPITAL | Age: 54
End: 2024-07-31
Payer: MEDICAID

## 2024-07-31 VITALS — HEIGHT: 62 IN | BODY MASS INDEX: 27.6 KG/M2 | WEIGHT: 150 LBS

## 2024-07-31 DIAGNOSIS — D50.8 IRON DEFICIENCY ANEMIA SECONDARY TO INADEQUATE DIETARY IRON INTAKE: Primary | ICD-10-CM

## 2024-07-31 DIAGNOSIS — R13.10 DYSPHAGIA, UNSPECIFIED TYPE: Primary | ICD-10-CM

## 2024-07-31 RX ORDER — EPINEPHRINE 0.3 MG/.3ML
0.3 INJECTION SUBCUTANEOUS ONCE AS NEEDED
OUTPATIENT
Start: 2024-07-31

## 2024-07-31 RX ORDER — DIPHENHYDRAMINE HYDROCHLORIDE 50 MG/ML
50 INJECTION INTRAMUSCULAR; INTRAVENOUS ONCE AS NEEDED
OUTPATIENT
Start: 2024-07-31

## 2024-07-31 RX ORDER — SODIUM CHLORIDE 0.9 % (FLUSH) 0.9 %
10 SYRINGE (ML) INJECTION
OUTPATIENT
Start: 2024-07-31

## 2024-07-31 RX ORDER — HEPARIN 100 UNIT/ML
500 SYRINGE INTRAVENOUS
OUTPATIENT
Start: 2024-07-31

## 2024-07-31 NOTE — TELEPHONE ENCOUNTER
Spoke to Barbara to schedule procedure(s) Upper Endoscopy (EGD)/Manometry       Physician to perform procedure(s) Dr. RICARDO Locke   Date of Procedure (s) 10/21/24  Arrival Time 7:15 AM  Time of Procedure(s) 8:15 AM   Location of Procedure(s) [address]  Type of Rx Prep sent to patient: Other  Instructions provided to patient via [facility]    Patient was informed on the following information and verbalized understanding. Screening questionnaire reviewed with patient and complete. If procedure requires anesthesia, a responsible adult needs to be present to accompany the patient home, patient cannot drive after receiving anesthesia. Appointment details are tentative, especially check-in time. Patient will receive a prep-op call 7 days prior to confirm check-in time for procedure. If applicable the patient should contact their pharmacy to verify Rx for procedure prep is ready for pick-up. Patient was advised to call the scheduling department at [phone] if pharmacy states no Rx is available. Patient was advised to call the endoscopy scheduling department if any questions or concerns arise.      [facility] Scheduling Department

## 2024-07-31 NOTE — PROGRESS NOTES
"  FOLLOW UP TELEMEDICINE VISIT    Subjective:      Patient ID: Barbara Lozano is a 53 y.o. female.  MRN: [id number]  : 1970    TELEMEDICINE  The patient location is: home  The chief complaint leading to consultation is: Iron deficiency anemia  Visit type: Virtual visit with synchronous audio and video    Total time spent with patient: minutes of total time spent on the encounter, which includes face to face time and non-face to face time preparing to see the patient (eg, review of tests), obtaining and/or reviewing separately obtained history, documenting clinical information in the electronic or other health record, independently interpreting results (not separately reported) and communicating results to the patient/family/caregiver, or care coordination (not separately reported).    Each patient to whom he or she provides medical services by telemedicine is:  (1) informed of the relationship between the physician and patient and the respective role of any other health care provider with respect to management of the patient; and (2) notified that he or she may decline to receive medical services by telemedicine and may withdraw from such care at any time.    History of Present Illness:   HPI  Ms Lozano presents for interval evaluation for a dx of BEREKET. She received 4 doses of IV Venofer 300 mg each from March 3, 2023 to 2023.      Today:  24  She presents via telemed today for review of labs.  Since our last appt 24, she has had c/o dysphagia & followed with Dr. Chucky Dickerson; plans for UGI.  She endorses fatigue & "feeling drained".  She denies any CP, palpitations, SOB, bleeding, cold extremities, pica, HA's, blurred vision, etc.  Labs reviewed.         Past medical, surgical, family, and social history were reviewed today and there are no changes of note unless mentioned in HPI.   MEDS and ALLERGIES were reviewed with patient and meds reconciled.     History:  Past Medical History: "   Diagnosis Date    Acute gastric ulcer     chronic Constipation     Diaphragmatic hernia     Diarrhea     Diverticulum of esophagus     Dysphagia     Esophagitis, unspecified without bleeding     Gastric diverticulum     Gastritis     Gastroparesis     GERD (gastroesophageal reflux disease)     Hemorrhoids     Irritable bowel syndrome     Migraines     Osteoporosis       Past Surgical History:   Procedure Laterality Date    APPENDECTOMY      CHOLECYSTECTOMY      EGD, WITH BALLOON DILATION  12/15/2022    20mm    HYSTERECTOMY      robotic assisted near total gastrectomy  2022     Family History   Problem Relation Name Age of Onset    Hypertension Mother      Diabetes Mother      Arthritis Mother      Heart disease Father          MI  ~ 45 yrs old    Other Maternal Grandmother          had colostomy bag ? how many yr. had when she     Esophageal cancer Neg Hx      Stomach cancer Neg Hx      Colon cancer Neg Hx      Colon polyps Neg Hx      Pancreatic cancer Neg Hx      Liver cancer Neg Hx      Celiac disease Neg Hx      Crohn's disease Neg Hx      Irritable bowel syndrome Neg Hx      Ulcerative colitis Neg Hx      Rectal cancer Neg Hx        Social History     Tobacco Use    Smoking status: Never    Smokeless tobacco: Never   Substance and Sexual Activity    Alcohol use: No    Drug use: Never    Sexual activity: Yes     Partners: Male        ROS:  Review of Systems   Constitutional:  Positive for malaise/fatigue.   Respiratory:  Negative for cough and shortness of breath.    Cardiovascular:  Negative for chest pain.   Gastrointestinal:  Negative for abdominal pain, constipation and diarrhea.   Genitourinary:  Negative for frequency.   Musculoskeletal:  Negative for back pain.   Skin:  Negative for rash.   Neurological:  Negative for headaches.   Psychiatric/Behavioral:  The patient is not nervous/anxious.        Objective:   There were no vitals filed for this visit.  Wt Readings from Last 10  Encounters:   07/31/24 68 kg (150 lb)   07/11/24 68 kg (150 lb)   04/16/24 73.5 kg (162 lb)   06/01/23 73.5 kg (162 lb 0.6 oz)   04/14/23 73.6 kg (162 lb 4.1 oz)   03/10/23 74.8 kg (165 lb)   03/08/23 74.8 kg (165 lb)   03/03/23 74.8 kg (165 lb)   02/28/23 73.8 kg (162 lb 11.2 oz)   04/12/21 68.2 kg (150 lb 5.7 oz)       Physical Examination:   Constitutional: she is alert, pleasant, and does not appear to be in any physical distress   Eyes: No obvious jaundice or conjunctivitis.  EOM are normal.   Pulmonary/Chest: No stridor noted. No excess chest muscle movement.  Neurological: she is alert and oriented to person, place, and time. No cranial nerve deficit.  Skin:  No rash noted. No erythema.   Psychiatric: she has a normal mood and affect. Speech and memory normal.       Laboratory Data:  Lab Results   Component Value Date    WBC 8.36 07/29/2024    HGB 10.5 (L) 07/29/2024    HCT 30.7 (L) 07/29/2024     07/29/2024    ALT 28 02/27/2023    AST 24 02/27/2023     02/27/2023    K 3.3 (L) 02/27/2023     04/12/2021    CREATININE 0.78 02/27/2023    BUN 20 02/27/2023    CO2 25 02/27/2023      Hgb decline    Lab Results   Component Value Date    IRON 69 07/29/2024    TRANSFERRIN 166 (L) 07/29/2024    TIBC 246 (L) 07/29/2024    FESATURATED 28 07/29/2024      Lab Results   Component Value Date    FERRITIN 158 07/29/2024        Assessment/Plan:     Iron deficiency anemia due to malabsorption:  -I reviewed independently the patient laboratory and radiologic findings her medical records from [facility] also were reviewed.  I discussed her blood workup going back to 2021 with the patient was anemic with persistent leukocytosis and thrombocytosis.  Her iron studies are favoring iron-deficiency anemia.  Her % saturation is low.  Even though she has been taking oral iron for the last 2 months most likely she is not able to absorb the oral iron due to her recent gastrectomy and her oral intake of PPI.    -The  patient  received IV Venofer at a dose of 300 mg weekly for 4 doses.  Her Hgb improved and ferritin  went from 17 -291  -She will be seen again in 3 months with repeat CBC and ferrtin  - 10/12:  Hgb has remained @ 10.6; Ferritin has remained stable @ 255; B12 improved from 342 to 409  Will reassess in 3 months after evaluation from surgeon of abdomen & hemorrhoids with CBC, Iron studies/ferritin prior.  -01/29/24:  Stable, asymptomatic; improved labs; will reevaluate in 6 months with CBC, Iron studies/ferritin/Vitamin b12/folate prior.  Call for any worsening s/s of anemia prior.  07/31/24:  Symptomatic; last iron infusions 06/23; Ganzoni deficit of 853 mg; Ferrlecit 125 mg weekly x 6 submitted for auth; f/u 4 vv with CBC, Iron studies/ferritin prior     Vitamin B12 deficiency :  -continue oral Vitamin B12 at a dose 1000 mcg daily.   -recheck levels in 6 months         Component Ref Range & Units 2 d ago 10 mo ago 1 yr ago   Vitamin B-12 210 - 950 pg/mL 210 409 342      07/31/24:  Noncompliant with oral B12; resume.    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  -in view of her abdominal pain at the site of the surgery the patient was advised to follow up with her surgeon.  Call for an earlier appt if symptoms worsen; have MD evaluate hemorrhoids.     Dysphagia - follow with GI      OZIEL Charlton, FNP-C  [facility]  20 minutes were spent in coordination of patient's care, record review and counseling.      Med Onc Chart Routing      Follow up with physician    Follow up with ELDA . F/u 4 months (may be VV) with labs prior;  CBC, Iron studies/ferritin   Infusion scheduling note New or changed treatment   Ferrlecit 125 mg IV weekly x 6   Injection scheduling note    Labs    Imaging    Pharmacy appointment    Other  referrals                 Answers submitted by the patient for this visit:  Review of Systems Questionnaire (Submitted on 7/30/2024)  appetite change : No  unexpected weight change: No  mouth sores: No  visual disturbance: No  adenopathy: No

## 2024-08-01 ENCOUNTER — PATIENT MESSAGE (OUTPATIENT)
Dept: INFUSION THERAPY | Facility: HOSPITAL | Age: 54
End: 2024-08-01
Payer: MEDICAID

## 2024-08-28 ENCOUNTER — TELEPHONE (OUTPATIENT)
Dept: INFUSION THERAPY | Facility: HOSPITAL | Age: 54
End: 2024-08-28
Payer: MEDICAID

## 2024-08-28 NOTE — TELEPHONE ENCOUNTER
----- Message from Courtney Alvarado sent at 8/28/2024  9:11 AM CDT -----  Type: Needs Medical Advice  Who Called:  Patient   Symptoms (please be specific):    How long has patient had these symptoms:    Pharmacy name and phone #:    Best Call Back Number: [phone]  Additional Information: Patient is requesting a call back to reschedule her appt. on 8/28 at 9:30.

## 2024-08-28 NOTE — TELEPHONE ENCOUNTER
Returned patients call and LVM letting her know that I moved her infusion that we would see her on Tuesday.

## 2024-09-24 ENCOUNTER — TELEPHONE (OUTPATIENT)
Dept: INFUSION THERAPY | Facility: HOSPITAL | Age: 54
End: 2024-09-24
Payer: MEDICAID

## 2024-09-24 NOTE — TELEPHONE ENCOUNTER
LVM for the patient letting her know that I did not have any available appointments that I would just put todays visit at the end of the cycle

## 2024-10-02 ENCOUNTER — TELEPHONE (OUTPATIENT)
Dept: GASTROENTEROLOGY | Facility: CLINIC | Age: 54
End: 2024-10-02
Payer: MEDICAID

## 2024-10-02 ENCOUNTER — TELEPHONE (OUTPATIENT)
Dept: ENDOSCOPY | Facility: HOSPITAL | Age: 54
End: 2024-10-02
Payer: MEDICAID

## 2024-10-02 ENCOUNTER — PATIENT MESSAGE (OUTPATIENT)
Dept: GASTROENTEROLOGY | Facility: CLINIC | Age: 54
End: 2024-10-02
Payer: MEDICAID

## 2024-10-02 NOTE — TELEPHONE ENCOUNTER
----- Message from Lauro Patterson sent at 10/2/2024  3:30 PM CDT -----  Regarding: r/s upcoming appointment  Contact: pt @[phone]    ----- Message -----  From: Shria Phoenix  Sent: 10/2/2024   3:21 PM CDT  To: [facility]ucky Rivera Staff  Subject: Consult/Advisory                                 Consult/Advisory     Name Of Caller: Barbara Lozano     Contact Preference:[phone]     Nature of call:pt is calling to get surgery rescheduled to 10/30. Asking for a call back

## 2024-10-02 NOTE — TELEPHONE ENCOUNTER
----- Message from Shirley Galaviz sent at 10/2/2024 10:31 AM CDT -----  Regarding: Advise  Contact: [phone]  Patient is calling stating that she needs procedure instructions for procedure scheduled on 10/21/24 placed in portal.

## 2024-10-08 ENCOUNTER — TELEPHONE (OUTPATIENT)
Dept: INFUSION THERAPY | Facility: HOSPITAL | Age: 54
End: 2024-10-08
Payer: MEDICAID

## 2024-10-08 NOTE — TELEPHONE ENCOUNTER
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

## 2024-10-11 ENCOUNTER — TELEPHONE (OUTPATIENT)
Dept: INFUSION THERAPY | Facility: HOSPITAL | Age: 54
End: 2024-10-11
Payer: MEDICAID

## 2024-10-11 NOTE — TELEPHONE ENCOUNTER
----- Message from Alberta sent at 10/11/2024  9:47 AM CDT -----  Type: Needs Medical Advice  Who Called:  Patient   Symptoms (please be specific):    How long has patient had these symptoms:    Pharmacy name and phone #:    Best Call Back Number: [phone]  Additional Information: Patient is requesting a call back to reschedule her appt. on 10/14.

## 2024-10-15 ENCOUNTER — TELEPHONE (OUTPATIENT)
Dept: INFUSION THERAPY | Facility: HOSPITAL | Age: 54
End: 2024-10-15
Payer: MEDICAID

## 2024-10-22 ENCOUNTER — TELEPHONE (OUTPATIENT)
Dept: HEMATOLOGY/ONCOLOGY | Facility: CLINIC | Age: 54
End: 2024-10-22
Payer: MEDICAID

## 2024-10-22 NOTE — TELEPHONE ENCOUNTER
Returned pt's phone call, pt stated she cancelled her upcoming infusion appts due to having had dental work.  She would like to reschedule the infusions.  Alvina, infusion  notifed, and informed pt  will call her later today with first available appt.  Pt verbalized understanding.    ----- Message from Yesenia sent at 10/22/2024  7:57 AM CDT -----  Contact: PT  Type: Needs Medical Advice    Who Called: PT  Best Call Back Number: [phone]  Additional  Information: PT requesting a call back to know when  her next Infusion will be, all scheduled upcoming appts has been canceled   Please Advise- Thank you

## 2024-10-23 ENCOUNTER — TELEPHONE (OUTPATIENT)
Dept: INFUSION THERAPY | Facility: HOSPITAL | Age: 54
End: 2024-10-23
Payer: MEDICAID

## 2024-11-14 ENCOUNTER — TELEPHONE (OUTPATIENT)
Dept: HEMATOLOGY/ONCOLOGY | Facility: CLINIC | Age: 54
End: 2024-11-14
Payer: MEDICAID

## 2024-11-14 NOTE — TELEPHONE ENCOUNTER
Spoke with the pt and the pt decided to keep the vv appt on 11/26. Pt verbalized and understanding.  ----- Message from Alberta sent at 11/14/2024 10:31 AM CST -----  Type: Needs Medical Advice  Who Called:  Patient  Symptoms (please be specific):    How long has patient had these symptoms:    Pharmacy name and phone #:    Best Call Back Number:   Additional Information: Patient is returning a missed call from Zainab.

## 2024-11-14 NOTE — TELEPHONE ENCOUNTER
Left message to call the office to schedule/reschedule appt. Recall number provided.  ----- Message from Aquacue Christy sent at 11/14/2024  9:54 AM CST -----  Regarding: selvin  Type: Needs Medical Advice  Who Called:  hitesh  Best Call Back Number: [phone]    Additional Information: hitesh states she needs to reschedule her  11/26 virtual to another day  after her 11/25 , please call to further assist , thank you

## 2024-11-15 ENCOUNTER — INFUSION (OUTPATIENT)
Dept: INFUSION THERAPY | Facility: HOSPITAL | Age: 54
End: 2024-11-15
Attending: NURSE PRACTITIONER
Payer: MEDICAID

## 2024-11-15 VITALS
BODY MASS INDEX: 27.06 KG/M2 | DIASTOLIC BLOOD PRESSURE: 67 MMHG | TEMPERATURE: 99 F | HEART RATE: 91 BPM | SYSTOLIC BLOOD PRESSURE: 122 MMHG | RESPIRATION RATE: 17 BRPM | WEIGHT: 147.06 LBS | HEIGHT: 62 IN | OXYGEN SATURATION: 97 %

## 2024-11-15 DIAGNOSIS — D50.8 IRON DEFICIENCY ANEMIA SECONDARY TO INADEQUATE DIETARY IRON INTAKE: Primary | ICD-10-CM

## 2024-11-15 PROCEDURE — 96365 THER/PROPH/DIAG IV INF INIT: CPT | Mod: PN

## 2024-11-15 PROCEDURE — 25000003 PHARM REV CODE 250: Mod: PN | Performed by: NURSE PRACTITIONER

## 2024-11-15 PROCEDURE — A4216 STERILE WATER/SALINE, 10 ML: HCPCS | Mod: PN | Performed by: NURSE PRACTITIONER

## 2024-11-15 PROCEDURE — 63600175 PHARM REV CODE 636 W HCPCS: Mod: PN | Performed by: NURSE PRACTITIONER

## 2024-11-15 RX ORDER — EPINEPHRINE 0.3 MG/.3ML
0.3 INJECTION SUBCUTANEOUS ONCE AS NEEDED
OUTPATIENT
Start: 2024-11-22

## 2024-11-15 RX ORDER — SODIUM CHLORIDE 0.9 % (FLUSH) 0.9 %
10 SYRINGE (ML) INJECTION
Status: DISCONTINUED | OUTPATIENT
Start: 2024-11-15 | End: 2024-11-15 | Stop reason: HOSPADM

## 2024-11-15 RX ORDER — EPINEPHRINE 0.3 MG/.3ML
0.3 INJECTION SUBCUTANEOUS ONCE AS NEEDED
Status: DISCONTINUED | OUTPATIENT
Start: 2024-11-15 | End: 2024-11-15 | Stop reason: HOSPADM

## 2024-11-15 RX ORDER — SODIUM CHLORIDE 0.9 % (FLUSH) 0.9 %
10 SYRINGE (ML) INJECTION
OUTPATIENT
Start: 2024-11-22

## 2024-11-15 RX ORDER — HEPARIN 100 UNIT/ML
500 SYRINGE INTRAVENOUS
OUTPATIENT
Start: 2024-11-22

## 2024-11-15 RX ORDER — DIPHENHYDRAMINE HYDROCHLORIDE 50 MG/ML
50 INJECTION INTRAMUSCULAR; INTRAVENOUS ONCE AS NEEDED
Status: DISCONTINUED | OUTPATIENT
Start: 2024-11-15 | End: 2024-11-15 | Stop reason: HOSPADM

## 2024-11-15 RX ORDER — DIPHENHYDRAMINE HYDROCHLORIDE 50 MG/ML
50 INJECTION INTRAMUSCULAR; INTRAVENOUS ONCE AS NEEDED
OUTPATIENT
Start: 2024-11-22

## 2024-11-15 RX ADMIN — Medication 10 ML: at 09:11

## 2024-11-15 RX ADMIN — SODIUM CHLORIDE 125 MG: 9 INJECTION, SOLUTION INTRAVENOUS at 09:11

## 2024-11-15 NOTE — PLAN OF CARE
Pt here for IV infusion of ferrlecit. Pt tolerated well, no reactions noted. Pt monitored 30 minutes post infusion. Education reviewed r/t medication. Pt questions answered at this time. Pt ambulates off unit, denies any needs before departure. Pt aware of follow up appointments.     Problem: Adult Inpatient Plan of Care  Goal: Plan of Care Review  Outcome: Progressing  Flowsheets (Taken 11/15/2024 0902)  Plan of Care Reviewed With: patient  Goal: Patient-Specific Goal (Individualized)  Outcome: Progressing  Flowsheets (Taken 11/15/2024 0902)  Individualized Care Needs: recliner, blanket, tv, conversation  Anxieties, Fears or Concerns: none voiced  Patient/Family-Specific Goals (Include Timeframe): no s/s of reaction  Goal: Optimal Comfort and Wellbeing  Outcome: Progressing  Intervention: Monitor Pain and Promote Comfort  Flowsheets (Taken 11/15/2024 0902)  Pain Management Interventions:   quiet environment facilitated   care clustered  Intervention: Provide Person-Centered Care  Flowsheets (Taken 11/15/2024 0902)  Trust Relationship/Rapport:   care explained   questions answered   choices provided   questions encouraged   emotional support provided   reassurance provided   empathic listening provided   thoughts/feelings acknowledged     Problem: Anemia  Goal: Anemia Symptom Improvement  Outcome: Progressing  Intervention: Monitor and Manage Anemia  Flowsheets (Taken 11/15/2024 0902)  Oral Nutrition Promotion: rest periods promoted  Safety Promotion/Fall Prevention: assistive device/personal item within reach  Fatigue Management:   frequent rest breaks encouraged   paced activity encouraged   fatigue-related activity identified

## 2024-11-22 ENCOUNTER — TELEPHONE (OUTPATIENT)
Dept: HEMATOLOGY/ONCOLOGY | Facility: CLINIC | Age: 54
End: 2024-11-22
Payer: MEDICAID

## 2024-11-22 NOTE — TELEPHONE ENCOUNTER
Returned pt's phone call.  Pt stated after receiving her last iron infusion (Ferrlecit), last week for 3 days she experienced dizziness, blurred vision and weakness.  These symptoms are now resolved, and her next iron infusion is scheduled for 11/29.  She wanted to let Jason Martines NP know, prior to receiving the next dose.  Will forward to Jason, who will see the message on 11/25.  Instructed pt to always report symptoms right away.  Pt verbalized understanding.   ----- Message from Alberta sent at 11/22/2024  8:38 AM CST -----  Type: Needs Medical Advice  Who Called: patient    Best Call Back Number: [phone]  Additional Information: Pt requesting call back from jerod

## 2024-11-25 ENCOUNTER — TELEPHONE (OUTPATIENT)
Dept: HEMATOLOGY/ONCOLOGY | Facility: CLINIC | Age: 54
End: 2024-11-25
Payer: MEDICAID

## 2024-11-25 RX ORDER — DIPHENHYDRAMINE HYDROCHLORIDE 50 MG/ML
25 INJECTION INTRAMUSCULAR; INTRAVENOUS
Start: 2024-11-25

## 2024-11-25 RX ORDER — FAMOTIDINE 10 MG/ML
20 INJECTION INTRAVENOUS
Start: 2024-11-25

## 2024-11-25 NOTE — PROGRESS NOTES
Returned pt's phone call.  Pt stated after receiving her last iron infusion (Ferrlecit), last week for 3 days she experienced dizziness, blurred vision and weakness.  These symptoms are now resolved, and her next iron infusion is scheduled for 11/29.  She wanted to let Jason Martines NP know, prior to receiving the next dose.  Will forward to Jason, who will see the message on 11/25.  Instructed pt to always report symptoms right away.  Pt verbalized understanding.        Ordered premeds of Benadry & Famotidine prior to last 2 iron infusions.

## 2024-11-25 NOTE — TELEPHONE ENCOUNTER
Patient stated that she saw results for a brain MRI on her mychart but that she has never had a brain MRI before and she doesn't know who the doctor is that ordered it. Informed her that the brain MRI is from 12/21/2020 and it was done when she went to the [facility] ER. Patient verbalized understanding.    ----- Message from [facility] sent at 11/25/2024 10:17 AM CST -----  Type: Needs Medical Advice  Who Called:  hitesh  Best Call Back Number: [phone]      Additional Information: hitesh states in her my chart she has another patients test and results , please call to further discuss thank you .

## 2024-12-02 ENCOUNTER — TELEPHONE (OUTPATIENT)
Dept: INFUSION THERAPY | Facility: HOSPITAL | Age: 54
End: 2024-12-02
Payer: MEDICAID

## 2024-12-02 NOTE — TELEPHONE ENCOUNTER
----- Message from Brenda sent at 12/2/2024  9:32 AM CST -----  Type: Needs Medical Advice  Who Called:  Barbara  Best Call Back Number: [phone]   Additional Information:  Barbara is requesting her appt on 12/6 be pushes back to 1:30pm-2:00pm. Please return call

## 2024-12-05 ENCOUNTER — INFUSION (OUTPATIENT)
Dept: INFUSION THERAPY | Facility: HOSPITAL | Age: 54
End: 2024-12-05
Attending: NURSE PRACTITIONER
Payer: MEDICAID

## 2024-12-05 VITALS
HEIGHT: 62 IN | WEIGHT: 145.31 LBS | BODY MASS INDEX: 26.74 KG/M2 | OXYGEN SATURATION: 96 % | RESPIRATION RATE: 18 BRPM | DIASTOLIC BLOOD PRESSURE: 72 MMHG | HEART RATE: 86 BPM | SYSTOLIC BLOOD PRESSURE: 117 MMHG | TEMPERATURE: 99 F

## 2024-12-05 DIAGNOSIS — D50.8 IRON DEFICIENCY ANEMIA SECONDARY TO INADEQUATE DIETARY IRON INTAKE: Primary | ICD-10-CM

## 2024-12-05 PROCEDURE — 96365 THER/PROPH/DIAG IV INF INIT: CPT | Mod: PN

## 2024-12-05 PROCEDURE — 96375 TX/PRO/DX INJ NEW DRUG ADDON: CPT | Mod: PN

## 2024-12-05 PROCEDURE — 25000003 PHARM REV CODE 250: Mod: PN | Performed by: NURSE PRACTITIONER

## 2024-12-05 PROCEDURE — 63600175 PHARM REV CODE 636 W HCPCS: Mod: PN | Performed by: NURSE PRACTITIONER

## 2024-12-05 RX ORDER — SODIUM CHLORIDE 0.9 % (FLUSH) 0.9 %
10 SYRINGE (ML) INJECTION
OUTPATIENT
Start: 2024-12-12

## 2024-12-05 RX ORDER — FAMOTIDINE 10 MG/ML
20 INJECTION INTRAVENOUS
Start: 2024-12-12 | End: 2024-12-12

## 2024-12-05 RX ORDER — DIPHENHYDRAMINE HYDROCHLORIDE 50 MG/ML
25 INJECTION INTRAMUSCULAR; INTRAVENOUS
Status: COMPLETED | OUTPATIENT
Start: 2024-12-05 | End: 2024-12-05

## 2024-12-05 RX ORDER — FAMOTIDINE 10 MG/ML
20 INJECTION INTRAVENOUS
Status: COMPLETED | OUTPATIENT
Start: 2024-12-05 | End: 2024-12-05

## 2024-12-05 RX ORDER — DIPHENHYDRAMINE HYDROCHLORIDE 50 MG/ML
50 INJECTION INTRAMUSCULAR; INTRAVENOUS ONCE AS NEEDED
OUTPATIENT
Start: 2024-12-12

## 2024-12-05 RX ORDER — HEPARIN 100 UNIT/ML
500 SYRINGE INTRAVENOUS
OUTPATIENT
Start: 2024-12-12

## 2024-12-05 RX ORDER — DIPHENHYDRAMINE HYDROCHLORIDE 50 MG/ML
25 INJECTION INTRAMUSCULAR; INTRAVENOUS
Start: 2024-12-12

## 2024-12-05 RX ORDER — EPINEPHRINE 0.3 MG/.3ML
0.3 INJECTION SUBCUTANEOUS ONCE AS NEEDED
OUTPATIENT
Start: 2024-12-12

## 2024-12-05 RX ADMIN — DIPHENHYDRAMINE HYDROCHLORIDE 25 MG: 50 INJECTION, SOLUTION INTRAMUSCULAR; INTRAVENOUS at 03:12

## 2024-12-05 RX ADMIN — FAMOTIDINE 20 MG: 10 INJECTION INTRAVENOUS at 02:12

## 2024-12-05 RX ADMIN — SODIUM CHLORIDE 125 MG: 9 INJECTION, SOLUTION INTRAVENOUS at 03:12

## 2024-12-05 NOTE — PLAN OF CARE
Problem: Adult Inpatient Plan of Care  Goal: Plan of Care Review  Outcome: Progressing  Goal: Patient-Specific Goal (Individualized)  Outcome: Progressing  Goal: Optimal Comfort and Wellbeing  Outcome: Progressing  Intervention: Monitor Pain and Promote Comfort  Flowsheets (Taken 12/5/2024 9880)  Pain Management Interventions: warm blanket provided

## 2024-12-05 NOTE — NURSING
Pt arrived to unit for ferrlecit. Instructed pt will need to wait 30 minutes after to be observed.    1625 Pt states can not wait the 30 minute observation due her ride is leaving. Instructed pt if has any reaction to go straight to ED. Pt verbalized understanding.

## 2024-12-06 ENCOUNTER — TELEPHONE (OUTPATIENT)
Dept: INFUSION THERAPY | Facility: HOSPITAL | Age: 54
End: 2024-12-06
Payer: MEDICAID

## 2024-12-06 ENCOUNTER — TELEPHONE (OUTPATIENT)
Dept: ENDOSCOPY | Facility: HOSPITAL | Age: 54
End: 2024-12-06
Payer: MEDICAID

## 2024-12-06 NOTE — TELEPHONE ENCOUNTER
----- Message from Brenda sent at 12/6/2024 10:18 AM CST -----  Type: Needs Medical Advice  Who Called:  hitesh  Zeferino Call Back Number: [phone]   Additional Information: requesting all her infusion appt be moved to Thursday, starting with her 12/13 appt. Please call pt back to confirm changes

## 2024-12-06 NOTE — TELEPHONE ENCOUNTER
----- Message from Alejandra sent at 12/6/2024  9:09 AM CST -----  Regarding: FW: Cancel Procedure  Contact: [phone]    ----- Message -----  From: Daniela Cortes  Sent: 12/6/2024   8:09 AM CST  To: [facility] Endoscopy Schedulers; [facility]ucky Rivera Staff  Subject: Cancel Procedure                                 Calling to cancel upcoming procedure on 12-09-24  due to transportation. She said she did not want to reschedule just cancel. Please contact patient as soon as possible.

## 2024-12-06 NOTE — TELEPHONE ENCOUNTER
Contacted patient regarding message.  She confirmed that she wants to cancel because of transportation.  She will call back to reschedule after she gets a ride lined up.  Main line phone number provided.

## 2024-12-12 ENCOUNTER — INFUSION (OUTPATIENT)
Dept: INFUSION THERAPY | Facility: HOSPITAL | Age: 54
End: 2024-12-12
Attending: NURSE PRACTITIONER
Payer: MEDICAID

## 2024-12-12 VITALS
HEIGHT: 62 IN | WEIGHT: 145.31 LBS | RESPIRATION RATE: 20 BRPM | BODY MASS INDEX: 26.74 KG/M2 | HEART RATE: 72 BPM | TEMPERATURE: 98 F | DIASTOLIC BLOOD PRESSURE: 83 MMHG | SYSTOLIC BLOOD PRESSURE: 124 MMHG | OXYGEN SATURATION: 98 %

## 2024-12-12 DIAGNOSIS — D50.8 IRON DEFICIENCY ANEMIA SECONDARY TO INADEQUATE DIETARY IRON INTAKE: Primary | ICD-10-CM

## 2024-12-12 PROCEDURE — 96375 TX/PRO/DX INJ NEW DRUG ADDON: CPT | Mod: PN

## 2024-12-12 PROCEDURE — 96365 THER/PROPH/DIAG IV INF INIT: CPT | Mod: PN

## 2024-12-12 PROCEDURE — 63600175 PHARM REV CODE 636 W HCPCS: Mod: PN | Performed by: NURSE PRACTITIONER

## 2024-12-12 PROCEDURE — 25000003 PHARM REV CODE 250: Mod: PN | Performed by: NURSE PRACTITIONER

## 2024-12-12 RX ORDER — DIPHENHYDRAMINE HYDROCHLORIDE 50 MG/ML
50 INJECTION INTRAMUSCULAR; INTRAVENOUS ONCE AS NEEDED
OUTPATIENT
Start: 2024-12-19

## 2024-12-12 RX ORDER — HEPARIN 100 UNIT/ML
500 SYRINGE INTRAVENOUS
OUTPATIENT
Start: 2024-12-19

## 2024-12-12 RX ORDER — DIPHENHYDRAMINE HYDROCHLORIDE 50 MG/ML
25 INJECTION INTRAMUSCULAR; INTRAVENOUS
Status: CANCELLED
Start: 2024-12-19

## 2024-12-12 RX ORDER — FAMOTIDINE 10 MG/ML
20 INJECTION INTRAVENOUS
Status: CANCELLED
Start: 2024-12-19 | End: 2024-12-19

## 2024-12-12 RX ORDER — FAMOTIDINE 10 MG/ML
20 INJECTION INTRAVENOUS
Status: COMPLETED | OUTPATIENT
Start: 2024-12-12 | End: 2024-12-12

## 2024-12-12 RX ORDER — EPINEPHRINE 0.3 MG/.3ML
0.3 INJECTION SUBCUTANEOUS ONCE AS NEEDED
OUTPATIENT
Start: 2024-12-19

## 2024-12-12 RX ORDER — SODIUM CHLORIDE 0.9 % (FLUSH) 0.9 %
10 SYRINGE (ML) INJECTION
OUTPATIENT
Start: 2024-12-19

## 2024-12-12 RX ORDER — DIPHENHYDRAMINE HYDROCHLORIDE 50 MG/ML
25 INJECTION INTRAMUSCULAR; INTRAVENOUS
Status: COMPLETED | OUTPATIENT
Start: 2024-12-12 | End: 2024-12-12

## 2024-12-12 RX ADMIN — DIPHENHYDRAMINE HYDROCHLORIDE 25 MG: 50 INJECTION, SOLUTION INTRAMUSCULAR; INTRAVENOUS at 12:12

## 2024-12-12 RX ADMIN — SODIUM CHLORIDE 125 MG: 9 INJECTION, SOLUTION INTRAVENOUS at 12:12

## 2024-12-12 RX ADMIN — FAMOTIDINE 20 MG: 10 INJECTION INTRAVENOUS at 12:12

## 2024-12-12 RX ADMIN — SODIUM CHLORIDE: 9 INJECTION, SOLUTION INTRAVENOUS at 12:12

## 2024-12-26 ENCOUNTER — INFUSION (OUTPATIENT)
Dept: INFUSION THERAPY | Facility: HOSPITAL | Age: 54
End: 2024-12-26
Attending: NURSE PRACTITIONER
Payer: MEDICAID

## 2024-12-26 VITALS
TEMPERATURE: 98 F | DIASTOLIC BLOOD PRESSURE: 74 MMHG | BODY MASS INDEX: 26.74 KG/M2 | SYSTOLIC BLOOD PRESSURE: 107 MMHG | HEART RATE: 73 BPM | HEIGHT: 62 IN | WEIGHT: 145.31 LBS | RESPIRATION RATE: 20 BRPM | OXYGEN SATURATION: 98 %

## 2024-12-26 DIAGNOSIS — D50.8 IRON DEFICIENCY ANEMIA SECONDARY TO INADEQUATE DIETARY IRON INTAKE: Primary | ICD-10-CM

## 2024-12-26 PROCEDURE — 63600175 PHARM REV CODE 636 W HCPCS: Mod: PN | Performed by: NURSE PRACTITIONER

## 2024-12-26 PROCEDURE — 96365 THER/PROPH/DIAG IV INF INIT: CPT | Mod: PN

## 2024-12-26 PROCEDURE — 25000003 PHARM REV CODE 250: Mod: PN | Performed by: NURSE PRACTITIONER

## 2024-12-26 RX ORDER — DIPHENHYDRAMINE HYDROCHLORIDE 50 MG/ML
50 INJECTION INTRAMUSCULAR; INTRAVENOUS ONCE AS NEEDED
OUTPATIENT
Start: 2025-01-02

## 2024-12-26 RX ORDER — EPINEPHRINE 0.3 MG/.3ML
0.3 INJECTION SUBCUTANEOUS ONCE AS NEEDED
OUTPATIENT
Start: 2025-01-02

## 2024-12-26 RX ORDER — SODIUM CHLORIDE 0.9 % (FLUSH) 0.9 %
10 SYRINGE (ML) INJECTION
OUTPATIENT
Start: 2025-01-02

## 2024-12-26 RX ORDER — HEPARIN 100 UNIT/ML
500 SYRINGE INTRAVENOUS
OUTPATIENT
Start: 2025-01-02

## 2024-12-26 RX ORDER — DIPHENHYDRAMINE HYDROCHLORIDE 50 MG/ML
50 INJECTION INTRAMUSCULAR; INTRAVENOUS ONCE AS NEEDED
Status: DISCONTINUED | OUTPATIENT
Start: 2024-12-26 | End: 2024-12-26 | Stop reason: HOSPADM

## 2024-12-26 RX ADMIN — SODIUM CHLORIDE 125 MG: 9 INJECTION, SOLUTION INTRAVENOUS at 01:12

## 2024-12-26 NOTE — PLAN OF CARE
.Pt tolerated ferrlecit infusion well.  No adverse reaction noted.  IV flushed with NS and D/C per protocol.  Patient left clinic in no acute distress.

## 2024-12-30 ENCOUNTER — TELEPHONE (OUTPATIENT)
Dept: INFUSION THERAPY | Facility: HOSPITAL | Age: 54
End: 2024-12-30
Payer: MEDICAID

## 2024-12-30 NOTE — TELEPHONE ENCOUNTER
Spoke with the patient and let her know that I didn't have any sooner appointments available but if I get any cancellations I will call

## 2024-12-30 NOTE — TELEPHONE ENCOUNTER
----- Message from Alberta sent at 12/30/2024  8:15 AM CST -----  Type: Needs Medical Advice  Who Called:  Patient   Symptoms (please be specific):    How long has patient had these symptoms:    Pharmacy name and phone #:    Best Call Back Number: [phone]  Additional Information: Patient is requesting a call back from Alvina in regards to an earlier appt. Time on 1/2.

## 2025-01-02 ENCOUNTER — INFUSION (OUTPATIENT)
Dept: INFUSION THERAPY | Facility: HOSPITAL | Age: 55
End: 2025-01-02
Attending: NURSE PRACTITIONER
Payer: MEDICAID

## 2025-01-02 VITALS
SYSTOLIC BLOOD PRESSURE: 105 MMHG | HEART RATE: 95 BPM | TEMPERATURE: 99 F | HEIGHT: 62 IN | DIASTOLIC BLOOD PRESSURE: 74 MMHG | OXYGEN SATURATION: 100 % | WEIGHT: 145.06 LBS | BODY MASS INDEX: 26.69 KG/M2 | RESPIRATION RATE: 18 BRPM

## 2025-01-02 DIAGNOSIS — D50.8 IRON DEFICIENCY ANEMIA SECONDARY TO INADEQUATE DIETARY IRON INTAKE: Primary | ICD-10-CM

## 2025-01-02 PROCEDURE — 25000003 PHARM REV CODE 250: Mod: PN | Performed by: NURSE PRACTITIONER

## 2025-01-02 PROCEDURE — 96365 THER/PROPH/DIAG IV INF INIT: CPT | Mod: PN

## 2025-01-02 PROCEDURE — 63600175 PHARM REV CODE 636 W HCPCS: Mod: PN | Performed by: NURSE PRACTITIONER

## 2025-01-02 RX ORDER — DIPHENHYDRAMINE HYDROCHLORIDE 50 MG/ML
50 INJECTION INTRAMUSCULAR; INTRAVENOUS ONCE AS NEEDED
OUTPATIENT
Start: 2025-01-09

## 2025-01-02 RX ORDER — SODIUM CHLORIDE 0.9 % (FLUSH) 0.9 %
10 SYRINGE (ML) INJECTION
OUTPATIENT
Start: 2025-01-09

## 2025-01-02 RX ORDER — SODIUM CHLORIDE 0.9 % (FLUSH) 0.9 %
10 SYRINGE (ML) INJECTION
Status: DISCONTINUED | OUTPATIENT
Start: 2025-01-02 | End: 2025-01-02 | Stop reason: HOSPADM

## 2025-01-02 RX ORDER — EPINEPHRINE 0.3 MG/.3ML
0.3 INJECTION SUBCUTANEOUS ONCE AS NEEDED
Status: DISCONTINUED | OUTPATIENT
Start: 2025-01-02 | End: 2025-01-02 | Stop reason: HOSPADM

## 2025-01-02 RX ORDER — DIPHENHYDRAMINE HYDROCHLORIDE 50 MG/ML
50 INJECTION INTRAMUSCULAR; INTRAVENOUS ONCE AS NEEDED
Status: DISCONTINUED | OUTPATIENT
Start: 2025-01-02 | End: 2025-01-02 | Stop reason: HOSPADM

## 2025-01-02 RX ORDER — HEPARIN 100 UNIT/ML
500 SYRINGE INTRAVENOUS
OUTPATIENT
Start: 2025-01-09

## 2025-01-02 RX ORDER — EPINEPHRINE 0.3 MG/.3ML
0.3 INJECTION SUBCUTANEOUS ONCE AS NEEDED
OUTPATIENT
Start: 2025-01-09

## 2025-01-02 RX ADMIN — SODIUM CHLORIDE: 9 INJECTION, SOLUTION INTRAVENOUS at 01:01

## 2025-01-02 RX ADMIN — SODIUM CHLORIDE 125 MG: 9 INJECTION, SOLUTION INTRAVENOUS at 01:01

## 2025-01-02 NOTE — PLAN OF CARE
Problem: Adult Inpatient Plan of Care  Goal: Optimal Comfort and Wellbeing  Intervention: Provide Person-Centered Care  Flowsheets (Taken 1/2/2025 1505)  Trust Relationship/Rapport:   care explained   questions encouraged     Problem: Fatigue  Goal: Improved Activity Tolerance  Intervention: Promote Improved Energy  Flowsheets (Taken 1/2/2025 1505)  Fatigue Management:   paced activity encouraged   activity schedule adjusted  Sleep/Rest Enhancement: relaxation techniques promoted  Activity Management:   Ambulated -L4   Ambulated in esteves - L4  Environmental Support:   calm environment promoted   environmental consistency promoted     Problem: Adult Inpatient Plan of Care  Goal: Plan of Care Review  Outcome: Progressing  Flowsheets (Taken 1/2/2025 1505)  Plan of Care Reviewed With: patient  Tolerated treatment with no known distress.  Declined to wait 30 min post infusion observation.

## 2025-01-09 ENCOUNTER — TELEPHONE (OUTPATIENT)
Dept: INFUSION THERAPY | Facility: HOSPITAL | Age: 55
End: 2025-01-09
Payer: MEDICAID

## 2025-01-09 NOTE — TELEPHONE ENCOUNTER
----- Message from [employer] Christy sent at 1/9/2025  8:04 AM CST -----  Type: Needs Medical Advice  Who Called:  hitesh  Best Call Back Number: [phone]    Additional Information: hitesh needs to reschedule her 1/9 ferrlecit  infusion ,please call to further assist thank you

## 2025-01-29 ENCOUNTER — TELEPHONE (OUTPATIENT)
Dept: HEMATOLOGY/ONCOLOGY | Facility: CLINIC | Age: 55
End: 2025-01-29
Payer: MEDICAID

## 2025-02-06 ENCOUNTER — LAB VISIT (OUTPATIENT)
Dept: LAB | Facility: HOSPITAL | Age: 55
End: 2025-02-06
Attending: INTERNAL MEDICINE
Payer: MEDICAID

## 2025-02-06 DIAGNOSIS — D50.8 IRON DEFICIENCY ANEMIA SECONDARY TO INADEQUATE DIETARY IRON INTAKE: ICD-10-CM

## 2025-02-06 LAB
BASOPHILS # BLD AUTO: 0.09 K/UL (ref 0–0.2)
BASOPHILS NFR BLD: 0.8 % (ref 0–1.9)
DIFFERENTIAL METHOD BLD: ABNORMAL
EOSINOPHIL # BLD AUTO: 0.2 K/UL (ref 0–0.5)
EOSINOPHIL NFR BLD: 2.1 % (ref 0–8)
ERYTHROCYTE [DISTWIDTH] IN BLOOD BY AUTOMATED COUNT: 13 % (ref 11.5–14.5)
FERRITIN SERPL-MCNC: 221 NG/ML (ref 20–300)
HCT VFR BLD AUTO: 31.9 % (ref 37–48.5)
HGB BLD-MCNC: 10.6 G/DL (ref 12–16)
IMM GRANULOCYTES # BLD AUTO: 0.04 K/UL (ref 0–0.04)
IMM GRANULOCYTES NFR BLD AUTO: 0.4 % (ref 0–0.5)
IRON SERPL-MCNC: 55 UG/DL (ref 30–160)
LYMPHOCYTES # BLD AUTO: 3 K/UL (ref 1–4.8)
LYMPHOCYTES NFR BLD: 27.3 % (ref 18–48)
MCH RBC QN AUTO: 29.4 PG (ref 27–31)
MCHC RBC AUTO-ENTMCNC: 33.2 G/DL (ref 32–36)
MCV RBC AUTO: 88 FL (ref 82–98)
MONOCYTES # BLD AUTO: 0.9 K/UL (ref 0.3–1)
MONOCYTES NFR BLD: 7.8 % (ref 4–15)
NEUTROPHILS # BLD AUTO: 6.7 K/UL (ref 1.8–7.7)
NEUTROPHILS NFR BLD: 61.6 % (ref 38–73)
NRBC BLD-RTO: 0 /100 WBC
PLATELET # BLD AUTO: 452 K/UL (ref 150–450)
PMV BLD AUTO: 10.3 FL (ref 9.2–12.9)
RBC # BLD AUTO: 3.61 M/UL (ref 4–5.4)
SATURATED IRON: 22 % (ref 20–50)
TOTAL IRON BINDING CAPACITY: 255 UG/DL (ref 250–450)
TRANSFERRIN SERPL-MCNC: 172 MG/DL (ref 200–375)
WBC # BLD AUTO: 10.86 K/UL (ref 3.9–12.7)

## 2025-02-06 PROCEDURE — 85025 COMPLETE CBC W/AUTO DIFF WBC: CPT | Mod: PN | Performed by: NURSE PRACTITIONER

## 2025-02-06 PROCEDURE — 83540 ASSAY OF IRON: CPT | Performed by: NURSE PRACTITIONER

## 2025-02-06 PROCEDURE — 36415 COLL VENOUS BLD VENIPUNCTURE: CPT | Mod: PN | Performed by: NURSE PRACTITIONER

## 2025-02-06 PROCEDURE — 82728 ASSAY OF FERRITIN: CPT | Performed by: NURSE PRACTITIONER

## 2025-02-13 ENCOUNTER — OFFICE VISIT (OUTPATIENT)
Dept: HEMATOLOGY/ONCOLOGY | Facility: CLINIC | Age: 55
End: 2025-02-13
Payer: MEDICAID

## 2025-02-13 DIAGNOSIS — E53.8 B12 DEFICIENCY: ICD-10-CM

## 2025-02-13 DIAGNOSIS — Z90.3 STATUS POST GASTRECTOMY: ICD-10-CM

## 2025-02-13 DIAGNOSIS — D50.8 IRON DEFICIENCY ANEMIA SECONDARY TO INADEQUATE DIETARY IRON INTAKE: Primary | ICD-10-CM

## 2025-02-13 NOTE — PROGRESS NOTES
FOLLOW UP TELEMEDICINE VISIT    Subjective:      Patient ID: Barbara Lozano is a 54 y.o. female.  MRN: [id number]  : 1970    TELEMEDICINE  The patient location is: home  The chief complaint leading to consultation is: Iron deficiency anemia  Visit type: Virtual visit with synchronous audio and video    Total time spent with patient: minutes of total time spent on the encounter, which includes face to face time and non-face to face time preparing to see the patient (eg, review of tests), obtaining and/or reviewing separately obtained history, documenting clinical information in the electronic or other health record, independently interpreting results (not separately reported) and communicating results to the patient/family/caregiver, or care coordination (not separately reported).    Each patient to whom he or she provides medical services by telemedicine is:  (1) informed of the relationship between the physician and patient and the respective role of any other health care provider with respect to management of the patient; and (2) notified that he or she may decline to receive medical services by telemedicine and may withdraw from such care at any time.    2023 - 2023:  Venofer 300 mg x 4  11/15/24 - 25:  Ferrlecit 125 mg x 5/6    History of Present Illness:   HPI  Ms Lozano presents via telemed for eval & review of labs from previous IV iron infusions.  To note:  it has been 6 weeks from last iron infusion.  She endorses some fatigue, remains dizzy @ times.  Reviewed time factor of 6 weeks post last infusion.  Labs reviewed.  No CP, SOB, Palpitations, bleeding, cold extremities, pica, HA's, blurred vision, et.  Discussed plan to re-evaluate in 8 weeks as it has only been 6 weeks since last infusion.  Patient will call for any worsening s/s of anemia prior to follow up.         Past medical, surgical, family, and social history were reviewed today and there are no changes of note unless  mentioned in HPI.   MEDS and ALLERGIES were reviewed with patient and meds reconciled.     History:  Past Medical History:   Diagnosis Date    Acute gastric ulcer     chronic Constipation     Diaphragmatic hernia     Diarrhea     Diverticulum of esophagus     Dysphagia     Esophagitis, unspecified without bleeding     Gastric diverticulum     Gastritis     Gastroparesis     GERD (gastroesophageal reflux disease)     Hemorrhoids     Irritable bowel syndrome     Migraines     Osteoporosis       Past Surgical History:   Procedure Laterality Date    APPENDECTOMY      CHOLECYSTECTOMY      EGD, WITH BALLOON DILATION  12/15/2022    20mm    HYSTERECTOMY      robotic assisted near total gastrectomy  2022     Family History   Problem Relation Name Age of Onset    Hypertension Mother      Diabetes Mother      Arthritis Mother      Heart disease Father          MI  ~ 45 yrs old    Other Maternal Grandmother          had colostomy bag ? how many yr. had when she     Esophageal cancer Neg Hx      Stomach cancer Neg Hx      Colon cancer Neg Hx      Colon polyps Neg Hx      Pancreatic cancer Neg Hx      Liver cancer Neg Hx      Celiac disease Neg Hx      Crohn's disease Neg Hx      Irritable bowel syndrome Neg Hx      Ulcerative colitis Neg Hx      Rectal cancer Neg Hx        Social History     Tobacco Use    Smoking status: Never    Smokeless tobacco: Never   Substance and Sexual Activity    Alcohol use: No    Drug use: Never    Sexual activity: Yes     Partners: Male        ROS:  Review of Systems   Constitutional:  Positive for malaise/fatigue.   Respiratory:  Negative for cough and shortness of breath.    Cardiovascular:  Negative for chest pain.   Gastrointestinal:  Negative for abdominal pain, constipation and diarrhea.   Genitourinary:  Negative for frequency.   Musculoskeletal:  Negative for back pain.   Skin:  Negative for rash.   Neurological:  Negative for headaches.   Psychiatric/Behavioral:  The  patient is not nervous/anxious.        Objective:   There were no vitals filed for this visit.  Wt Readings from Last 10 Encounters:   01/02/25 65.8 kg (145 lb 1 oz)   12/26/24 65.9 kg (145 lb 4.5 oz)   12/12/24 65.9 kg (145 lb 4.5 oz)   12/05/24 65.9 kg (145 lb 4.5 oz)   11/15/24 66.7 kg (147 lb 0.8 oz)   07/31/24 68 kg (150 lb)   07/11/24 68 kg (150 lb)   04/16/24 73.5 kg (162 lb)   06/01/23 73.5 kg (162 lb 0.6 oz)   04/14/23 73.6 kg (162 lb 4.1 oz)       Physical Examination:   Constitutional: she is alert, pleasant, and does not appear to be in any physical distress   Eyes: No obvious jaundice or conjunctivitis.  EOM are normal.   Pulmonary/Chest: No stridor noted. No excess chest muscle movement.  Neurological: she is alert and oriented to person, place, and time. No cranial nerve deficit.  Psychiatric: she has a normal mood and affect. Speech and memory normal.       Laboratory Data:  Lab Results   Component Value Date    WBC 10.86 02/06/2025    HGB 10.6 (L) 02/06/2025    HCT 31.9 (L) 02/06/2025     (H) 02/06/2025    ALT 28 02/27/2023    AST 24 02/27/2023     02/27/2023    K 3.3 (L) 02/27/2023     04/12/2021    CREATININE 0.78 02/27/2023    BUN 20 02/27/2023    CO2 25 02/27/2023      Hgb decline    Lab Results   Component Value Date    IRON 55 02/06/2025    TRANSFERRIN 172 (L) 02/06/2025    TIBC 255 02/06/2025    FESATURATED 22 02/06/2025      Lab Results   Component Value Date    FERRITIN 221 02/06/2025        Assessment/Plan:     Iron deficiency anemia due to malabsorption:  -I reviewed independently the patient laboratory and radiologic findings her medical records from [facility] also were reviewed.  I discussed her blood workup going back to 2021 with the patient was anemic with persistent leukocytosis and thrombocytosis.  Her iron studies are favoring iron-deficiency anemia.  Her % saturation is low.  Even though she has been taking oral iron for the last 2 months most likely she  is not able to absorb the oral iron due to her recent gastrectomy and her oral intake of PPI.    -The patient  received IV Venofer at a dose of 300 mg weekly for 4 doses.  Her Hgb improved and ferritin  went from 17 -291  -She will be seen again in 3 months with repeat CBC and ferrtin  - 10/12:  Hgb has remained @ 10.6; Ferritin has remained stable @ 255; B12 improved from 342 to 409  Will reassess in 3 months after evaluation from surgeon of abdomen & hemorrhoids with CBC, Iron studies/ferritin prior.  -01/29/24:  Stable, asymptomatic; improved labs; will reevaluate in 6 months with CBC, Iron studies/ferritin/Vitamin b12/folate prior.  Call for any worsening s/s of anemia prior.  07/31/24:  Symptomatic; last iron infusions 06/23; Ganzoni deficit of 853 mg; Ferrlecit 125 mg weekly x 6 submitted for auth; f/u 4 vv with CBC, Iron studies/ferritin prior   02/13/25:  Remains with some symptoms of anemia; functional; only 6 weeks from last infusion; will recheck in 8 weeks with labs; patient to call if s/s of anemia worsen prior to f/u.    Vitamin B12 deficiency :  -continue oral Vitamin B12 at a dose 1000 mcg daily.   -recheck levels in 6 months  07/31/24:  Noncompliant with oral B12; resume.    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  -in view of her abdominal pain at the site of the surgery the patient was advised to follow up with her surgeon.  Call for an earlier appt if symptoms worsen; have MD evaluate hemorrhoids.     Note:  Patient needs Benadryl & Pepcid premeds for s/e with iron infusions      OZIEL Charlton, FNP-C  [facility]  15 minutes were spent in coordination of patient's care, record review and counseling.      Med Onc Chart Routing      Follow up with physician    Follow up with ELDA 2 months. f/u in 2 months VV with labs 1-2 days prior:  CBC, iron studies/ferritin/b12   Infusion scheduling note     Injection scheduling note    Labs    Imaging    Pharmacy appointment    Other referrals                 Answers submitted by the patient for this visit:  Review of Systems Questionnaire (Submitted on 2/7/2025)  appetite change : No  unexpected weight change: No  mouth sores: No  visual disturbance: No  adenopathy: No

## 2025-02-25 ENCOUNTER — TELEPHONE (OUTPATIENT)
Dept: HEMATOLOGY/ONCOLOGY | Facility: CLINIC | Age: 55
End: 2025-02-25
Payer: MEDICAID

## 2025-02-25 NOTE — TELEPHONE ENCOUNTER
----- Message from Daya sent at 2/25/2025  9:25 AM CST -----  Type: Needs Medical AdviceWho Called:  pt Symptoms (please be specific):  swollen legs, warm to touchHow long has patient had these symptoms:  a few daysPharmacy name and phone #:  Best Call Back Number:  Additional Information: pt requesting a call back from nurse

## 2025-03-05 ENCOUNTER — TELEPHONE (OUTPATIENT)
Dept: HEMATOLOGY/ONCOLOGY | Facility: CLINIC | Age: 55
End: 2025-03-05
Payer: MEDICAID

## 2025-03-05 NOTE — TELEPHONE ENCOUNTER
----- Message from Brenda sent at 3/5/2025  9:39 AM CST -----  Type: Needs Medical AdviceWho Called:  ptBest Call Back Number: [phone] Additional Information: requesting a call back to move 4/14 appt up sooner. Pt has been getting out of breath a lot

## 2025-03-18 ENCOUNTER — LAB VISIT (OUTPATIENT)
Dept: LAB | Facility: HOSPITAL | Age: 55
End: 2025-03-18
Attending: NURSE PRACTITIONER
Payer: MEDICAID

## 2025-03-18 ENCOUNTER — TELEPHONE (OUTPATIENT)
Dept: HEMATOLOGY/ONCOLOGY | Facility: CLINIC | Age: 55
End: 2025-03-18
Payer: MEDICAID

## 2025-03-18 DIAGNOSIS — D50.8 IRON DEFICIENCY ANEMIA SECONDARY TO INADEQUATE DIETARY IRON INTAKE: ICD-10-CM

## 2025-03-18 DIAGNOSIS — E53.8 B12 DEFICIENCY: ICD-10-CM

## 2025-03-18 LAB
BASOPHILS # BLD AUTO: 0.1 K/UL (ref 0–0.2)
BASOPHILS NFR BLD: 1 % (ref 0–1.9)
DIFFERENTIAL METHOD BLD: ABNORMAL
EOSINOPHIL # BLD AUTO: 0.1 K/UL (ref 0–0.5)
EOSINOPHIL NFR BLD: 0.7 % (ref 0–8)
ERYTHROCYTE [DISTWIDTH] IN BLOOD BY AUTOMATED COUNT: 12 % (ref 11.5–14.5)
FERRITIN SERPL-MCNC: 249 NG/ML (ref 20–300)
HCT VFR BLD AUTO: 29.7 % (ref 37–48.5)
HGB BLD-MCNC: 10.2 G/DL (ref 12–16)
IMM GRANULOCYTES # BLD AUTO: 0.07 K/UL (ref 0–0.04)
IMM GRANULOCYTES NFR BLD AUTO: 0.7 % (ref 0–0.5)
IRON SERPL-MCNC: 64 UG/DL (ref 30–160)
LYMPHOCYTES # BLD AUTO: 2.3 K/UL (ref 1–4.8)
LYMPHOCYTES NFR BLD: 22.1 % (ref 18–48)
MCH RBC QN AUTO: 29.9 PG (ref 27–31)
MCHC RBC AUTO-ENTMCNC: 34.3 G/DL (ref 32–36)
MCV RBC AUTO: 87 FL (ref 82–98)
MONOCYTES # BLD AUTO: 0.7 K/UL (ref 0.3–1)
MONOCYTES NFR BLD: 7.1 % (ref 4–15)
NEUTROPHILS # BLD AUTO: 7.1 K/UL (ref 1.8–7.7)
NEUTROPHILS NFR BLD: 68.4 % (ref 38–73)
NRBC BLD-RTO: 0 /100 WBC
PLATELET # BLD AUTO: 466 K/UL (ref 150–450)
PMV BLD AUTO: 9.3 FL (ref 9.2–12.9)
RBC # BLD AUTO: 3.41 M/UL (ref 4–5.4)
SATURATED IRON: 21 % (ref 20–50)
TOTAL IRON BINDING CAPACITY: 300 UG/DL (ref 250–450)
TRANSFERRIN SERPL-MCNC: 203 MG/DL (ref 200–375)
VIT B12 SERPL-MCNC: 284 PG/ML (ref 210–950)
WBC # BLD AUTO: 10.34 K/UL (ref 3.9–12.7)

## 2025-03-18 PROCEDURE — 84466 ASSAY OF TRANSFERRIN: CPT | Performed by: NURSE PRACTITIONER

## 2025-03-18 PROCEDURE — 82607 VITAMIN B-12: CPT | Performed by: NURSE PRACTITIONER

## 2025-03-18 PROCEDURE — 85025 COMPLETE CBC W/AUTO DIFF WBC: CPT | Mod: PO | Performed by: NURSE PRACTITIONER

## 2025-03-18 PROCEDURE — 82728 ASSAY OF FERRITIN: CPT | Performed by: NURSE PRACTITIONER

## 2025-03-18 PROCEDURE — 36415 COLL VENOUS BLD VENIPUNCTURE: CPT | Mod: PO | Performed by: NURSE PRACTITIONER

## 2025-03-18 NOTE — TELEPHONE ENCOUNTER
Returned patients call to assist with getting her lab appointment scheduled for this afternoon at the cancer center. Rescheduled virtual visit with Johnie for Friday 3/21 at 11:30 am.    ----- Message from Daya sent at 3/18/2025  9:07 AM CDT -----  Type: Needs Medical AdviceWho Called:  pt Symptoms (please be specific):  How long has patient had these symptoms:  Pharmacy name and phone #:  Best Call Back Number:  [phone] Information: pt is requesting a call back to reschedule appt on 3/19

## 2025-03-20 NOTE — PROGRESS NOTES
PATIENT: Barbara Lozano  MRN: [id number]  DATE: 3/21/2025    The patient location is: Home  The chief complaint leading to consultation is: Iron deficiency anemia    Visit type: audiovisual    Face to Face time with patient: 6 minutes  15  minutes of total time spent on the encounter, which includes face to face time and non-face to face time preparing to see the patient (eg, review of tests), Obtaining and/or reviewing separately obtained history, Documenting clinical information in the electronic or other health record, Independently interpreting results (not separately reported) and communicating results to the patient/family/caregiver, or Care coordination (not separately reported).         Each patient to whom he or she provides medical services by telemedicine is:  (1) informed of the relationship between the physician and patient and the respective role of any other health care provider with respect to management of the patient; and (2) notified that he or she may decline to receive medical services by telemedicine and may withdraw from such care at any time.    Notes:      Diagnosis:   1. B12 deficiency    2. Iron deficiency anemia secondary to inadequate dietary iron intake        Chief Complaint: Follow up BEREKET          Subjective:    Interval History: Ms. Lozano is a 54 y.o. female who returns for follow up. She completed IV iron infusions 2 months ago. She reports not taking Vit B12 for some time. Reports feeling improved overall since last visit. Denies cp, palpitations, fatigue, diarrhea, abdominal pain, new bumps, lumps, bleeding, bruising.     Prior History:   Per Record:   03/2023 - 06/2023:  Venofer 300 mg x 4  11/15/24 - 01/02/25:  Ferrlecit 125 mg x 5/6     History of Present Illness:   HPI  Ms Lozano presents via telemed for eval & review of labs from previous IV iron infusions.  To note:  it has been 6 weeks from last iron infusion.  She endorses some fatigue, remains dizzy @ times.  Reviewed  time factor of 6 weeks post last infusion.  Labs reviewed.  No CP, SOB, Palpitations, bleeding, cold extremities, pica, HA's, blurred vision, et.  Discussed plan to re-evaluate in 8 weeks as it has only been 6 weeks since last infusion.  Patient will call for any worsening s/s of anemia prior to follow up.    Oncology History    No history exists.       Past Medical History:   Past Medical History:   Diagnosis Date    Acute gastric ulcer     chronic Constipation     Diaphragmatic hernia     Diarrhea     Diverticulum of esophagus     Dysphagia     Esophagitis, unspecified without bleeding     Gastric diverticulum     Gastritis     Gastroparesis     GERD (gastroesophageal reflux disease)     Hemorrhoids     Irritable bowel syndrome     Migraines     Osteoporosis        Past Surgical HIstory:   Past Surgical History:   Procedure Laterality Date    APPENDECTOMY      CHOLECYSTECTOMY      EGD, WITH BALLOON DILATION  12/15/2022    20mm    HYSTERECTOMY      robotic assisted near total gastrectomy  2022       Family History:   Family History   Problem Relation Name Age of Onset    Hypertension Mother      Diabetes Mother      Arthritis Mother      Heart disease Father          MI  ~ 45 yrs old    Other Maternal Grandmother          had colostomy bag ? how many yr. had when she     Esophageal cancer Neg Hx      Stomach cancer Neg Hx      Colon cancer Neg Hx      Colon polyps Neg Hx      Pancreatic cancer Neg Hx      Liver cancer Neg Hx      Celiac disease Neg Hx      Crohn's disease Neg Hx      Irritable bowel syndrome Neg Hx      Ulcerative colitis Neg Hx      Rectal cancer Neg Hx         Social History:  reports that she has never smoked. She has never used smokeless tobacco. She reports that she does not drink alcohol and does not use drugs.    Allergies:  Review of patient's allergies indicates:   Allergen Reactions    Adhesive tape-silicones Blisters     Blisters -severe       Medications:  Current  Medications[1]    Review of Systems   Constitutional:  Negative for appetite change, fatigue and unexpected weight change.   HENT:  Negative for mouth sores.    Eyes:  Negative for visual disturbance.   Respiratory:  Negative for cough and shortness of breath.    Cardiovascular:  Negative for chest pain and palpitations.   Gastrointestinal:  Negative for abdominal pain and diarrhea.   Genitourinary:  Negative for frequency.   Musculoskeletal:  Negative for back pain.   Skin:  Negative for rash.   Neurological:  Negative for headaches.   Hematological:  Negative for adenopathy.   Psychiatric/Behavioral:  The patient is not nervous/anxious.        ECOG Performance Status:   ECOG SCORE             Objective:      Vitals: There were no vitals filed for this visit.  BMI: There is no height or weight on file to calculate BMI.    Physical Exam    Laboratory Data:  Lab Visit on 03/18/2025   Component Date Value Ref Range Status    WBC 03/18/2025 10.34  3.90 - 12.70 K/uL Final    RBC 03/18/2025 3.41 (L)  4.00 - 5.40 M/uL Final    Hemoglobin 03/18/2025 10.2 (L)  12.0 - 16.0 g/dL Final    Hematocrit 03/18/2025 29.7 (L)  37.0 - 48.5 % Final    MCV 03/18/2025 87  82 - 98 fL Final    MCH 03/18/2025 29.9  27.0 - 31.0 pg Final    MCHC 03/18/2025 34.3  32.0 - 36.0 g/dL Final    RDW 03/18/2025 12.0  11.5 - 14.5 % Final    Platelets 03/18/2025 466 (H)  150 - 450 K/uL Final    MPV 03/18/2025 9.3  9.2 - 12.9 fL Final    Immature Granulocytes 03/18/2025 0.7 (H)  0.0 - 0.5 % Final    Gran # (ANC) 03/18/2025 7.1  1.8 - 7.7 K/uL Final    Immature Grans (Abs) 03/18/2025 0.07 (H)  0.00 - 0.04 K/uL Final    Lymph # 03/18/2025 2.3  1.0 - 4.8 K/uL Final    Mono # 03/18/2025 0.7  0.3 - 1.0 K/uL Final    Eos # 03/18/2025 0.1  0.0 - 0.5 K/uL Final    Baso # 03/18/2025 0.10  0.00 - 0.20 K/uL Final    nRBC 03/18/2025 0  0 /100 WBC Final    Gran % 03/18/2025 68.4  38.0 - 73.0 % Final    Lymph % 03/18/2025 22.1  18.0 - 48.0 % Final    Mono % 03/18/2025  7.1  4.0 - 15.0 % Final    Eosinophil % 03/18/2025 0.7  0.0 - 8.0 % Final    Basophil % 03/18/2025 1.0  0.0 - 1.9 % Final    Differential Method 03/18/2025 Automated   Final    Iron 03/18/2025 64  30 - 160 ug/dL Final    Transferrin 03/18/2025 203  200 - 375 mg/dL Final    TIBC 03/18/2025 300  250 - 450 ug/dL Final    Saturated Iron 03/18/2025 21  20 - 50 % Final    Ferritin 03/18/2025 249  20.0 - 300.0 ng/mL Final    Vitamin B-12 03/18/2025 284  210 - 950 pg/mL Final          Imaging: Reviewed   Assessment:       1. B12 deficiency    2. Iron deficiency anemia secondary to inadequate dietary iron intake           Plan:     Iron deficiency anemia due to malabsorption:  -I reviewed independently the patient laboratory and radiologic findings her medical records from [facility] also were reviewed.  I discussed her blood workup going back to 2021 with the patient was anemic with persistent leukocytosis and thrombocytosis.  Her iron studies are favoring iron-deficiency anemia.  Her % saturation is low.  Even though she has been taking oral iron for the last 2 months most likely she is not able to absorb the oral iron due to her recent gastrectomy and her oral intake of PPI.    -The patient  received IV Venofer at a dose of 300 mg weekly for 4 doses.  Her Hgb improved and ferritin  went from 17 -291  -She will be seen again in 3 months with repeat CBC and ferrtin  - 10/12:  Hgb has remained @ 10.6; Ferritin has remained stable @ 255; B12 improved from 342 to 409  Will reassess in 3 months after evaluation from surgeon of abdomen & hemorrhoids with CBC, Iron studies/ferritin prior.  -01/29/24:  Stable, asymptomatic; improved labs; will reevaluate in 6 months with CBC, Iron studies/ferritin/Vitamin b12/folate prior.  Call for any worsening s/s of anemia prior.  07/31/24:  Symptomatic; last iron infusions 06/23; Ganzoni deficit of 853 mg; Ferrlecit 125 mg weekly x 6 submitted for auth; f/u 4 vv with CBC, Iron  studies/ferritin prior   02/13/25:  Remains with some symptoms of anemia; functional; only 6 weeks from last infusion; will recheck in 8 weeks with labs; patient to call if s/s of anemia worsen prior to f/u.  3/21/25: Stable and asymptomatic. Iron replete. Hgb stable. B12 <500.   Follow up in 6 weeks with repeat CBC, Iron studies/ferritin/Vitamin b12/folate prior.  Patient advised to call if s/s of anemia worsen prior to f/u.     Vitamin B12 deficiency :  -continue oral Vitamin B12 at a dose 1000 mcg daily.   -recheck levels in 6 months  07/31/24:  Noncompliant with oral B12; resume.  3/21/25: Not currently taking B12. E-script sent to [facility].     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  -in view of her abdominal pain at the site of the surgery the patient was advised to follow up with her surgeon.  Call for an earlier appt if symptoms worsen; have MD evaluate hemorrhoids.      Note:  Patient needs Benadryl & Pepcid premeds for s/e with iron infusions      Med Onc Chart Routing      Follow up with physician    Follow up with ELDA 6 weeks. VV with repeat cbc, cmp, iron studies, ferritin, b12, folate at least 1 day prior.   Infusion scheduling note    Injection scheduling note    Labs    Imaging    Pharmacy appointment    Other referrals                  Plan was discussed with the patient at length, and she verbalized understanding. Barbara was given an opportunity to ask questions that were answered to her satisfaction, and she was advised to call in the interval if any problems or questions arise.    Assessment/Plan reviewed and approved by Dr Otero    15 minutes were spent in coordination of patient's care, record review and counseling.    OZIEL Christensen, FNP-C  Hematology & Oncology         [1]   Current Outpatient Medications   Medication Sig Dispense Refill    amLODIPine (NORVASC) 10 MG tablet Take 10 mg by mouth.      atorvastatin (LIPITOR) 10 MG  tablet Take 10 mg by mouth.      cyanocobalamin, vitamin B-12, 1,000 mcg Cap Take 1,000 mcg by mouth once daily. 30 capsule 3    cyclobenzaprine (FLEXERIL) 10 MG tablet Take 10 mg by mouth 2 (two) times daily as needed.      dexlansoprazole (DEXILANT) 60 mg capsule Take 1 capsule by mouth every morning.      dicyclomine (BENTYL) 10 MG capsule Take 10 mg by mouth.      dicyclomine HCl (DICYCLOMINE ORAL) dicyclomine Take No date recorded No form recorded No frequency recorded No route recorded No set duration recorded No set duration amount recorded active No dosage strength recorded No dosage strength units of measure recorded      esomeprazole (NEXIUM) 40 MG capsule Take 40 mg by mouth every morning.      FEROSUL 325 mg (65 mg iron) Tab tablet Take by mouth.      GIMOTI 15 mg/spray SprP 1 spray by Each Nostril route 4 (four) times daily.      hydrOXYzine pamoate (VISTARIL) 50 MG Cap hydroxyzine pamoate 50 mg capsule   TAKE 1 TO 2 CAPSULES BY MOUTH AT BEDTIME AS NEEDED FOR INSOMNIA      levocetirizine (XYZAL) 5 MG tablet Take 5 mg by mouth.      LINZESS 290 mcg Cap capsule Take 290 mcg by mouth every morning.      methocarbamoL (ROBAXIN) 500 MG Tab Take 500-1,000 mg by mouth.      metoclopramide HCl (REGLAN) 10 MG tablet Take 1 tablet (10 mg total) by mouth every 6 (six) hours as needed (Nausea). 30 tablet 0    MOTEGRITY 2 mg Tab Take 1 tablet by mouth.      OMEPRAZOLE ORAL omeprazole Take No date recorded No form recorded No frequency recorded No route recorded No set duration recorded No set duration amount recorded active No dosage strength recorded No dosage strength units of measure recorded      ondansetron (ZOFRAN) 4 MG tablet Take 1 tablet (4 mg total) by mouth every 8 (eight) hours as needed for Nausea. 12 tablet 0    ondansetron (ZOFRAN-ODT) 8 MG TbDL 8 mg daily as needed.      oxybutynin (DITROPAN-XL) 10 MG 24 hr tablet Take 10 mg by mouth.      pantoprazole (PROTONIX) 40 MG tablet Take 40 mg by mouth.       promethazine (PHENERGAN) 25 MG tablet Take 25 mg by mouth every 12 (twelve) hours.      rizatriptan (MAXALT) 5 MG tablet SMARTSI Tablet(s) By Mouth 1-2 Times Daily      rOPINIRole (REQUIP) 0.25 MG tablet Take 0.25 mg by mouth.      TRULANCE 3 mg Tab Take 1 tablet by mouth.       No current facility-administered medications for this visit.

## 2025-03-21 ENCOUNTER — OFFICE VISIT (OUTPATIENT)
Dept: HEMATOLOGY/ONCOLOGY | Facility: CLINIC | Age: 55
End: 2025-03-21
Payer: MEDICAID

## 2025-03-21 DIAGNOSIS — D50.8 IRON DEFICIENCY ANEMIA SECONDARY TO INADEQUATE DIETARY IRON INTAKE: ICD-10-CM

## 2025-03-21 DIAGNOSIS — E53.8 B12 DEFICIENCY: Primary | ICD-10-CM

## 2025-03-21 RX ORDER — CHOLECALCIFEROL (VITAMIN D3) 25 MCG
1000 TABLET,CHEWABLE ORAL DAILY
Qty: 30 CAPSULE | Refills: 3 | Status: SHIPPED | OUTPATIENT
Start: 2025-03-21

## 2025-04-07 ENCOUNTER — TELEPHONE (OUTPATIENT)
Dept: HEMATOLOGY/ONCOLOGY | Facility: CLINIC | Age: 55
End: 2025-04-07
Payer: MEDICAID

## 2025-04-07 NOTE — TELEPHONE ENCOUNTER
Fixed patients appointments.      ----- Message from Brenda sent at 4/7/2025 11:24 AM CDT -----  Type:  Patient Returning CallWho Called:  PTWho Left Message for Patient:  Does the patient know what this is regarding?:  yesBest Call Back Number:  [phone] Additional Information:  requesting a call back

## 2025-04-07 NOTE — TELEPHONE ENCOUNTER
See other note.      ----- Message from Alberta sent at 4/7/2025 10:54 AM CDT -----  Type: Needs Medical AdviceWho Called:  Patient Symptoms (please be specific):  How long has patient had these symptoms:  Pharmacy name and phone #:  Best Call Back Number: [phone] Information: Patient is requesting to have orders for labs put in before upcoming appt..

## 2025-04-30 ENCOUNTER — TELEPHONE (OUTPATIENT)
Dept: HEMATOLOGY/ONCOLOGY | Facility: CLINIC | Age: 55
End: 2025-04-30
Payer: MEDICAID

## 2025-04-30 NOTE — TELEPHONE ENCOUNTER
Spoke with patient to assist with getting appointments rescheduled to the following week.    ----- Message from Daya sent at 4/30/2025  3:23 PM CDT -----  Type: Needs Medical AdviceWho Called:  pt Symptoms (please be specific):  How long has patient had these symptoms:  Pharmacy name and phone #:  Best Call Back Number: [phone] Information: pt is requesting a call back to reschedule appts on 5/1 and 5/2

## 2025-05-02 ENCOUNTER — PATIENT MESSAGE (OUTPATIENT)
Dept: HEMATOLOGY/ONCOLOGY | Facility: CLINIC | Age: 55
End: 2025-05-02
Payer: MEDICAID

## 2025-05-07 ENCOUNTER — DOCUMENTATION ONLY (OUTPATIENT)
Dept: HEMATOLOGY/ONCOLOGY | Facility: CLINIC | Age: 55
End: 2025-05-07
Payer: MEDICAID

## 2025-05-08 ENCOUNTER — TELEPHONE (OUTPATIENT)
Dept: HEMATOLOGY/ONCOLOGY | Facility: CLINIC | Age: 55
End: 2025-05-08
Payer: MEDICAID

## 2025-05-08 NOTE — TELEPHONE ENCOUNTER
Spoke with the pt and the pt rescheduled labs and vv f/u. Pt stated that she will be doing labs on 05/09 and vv on 05/14. Pt verbalized and understanding.  ----- Message from Alberta sent at 5/8/2025  9:08 AM CDT -----  Type: Needs Medical AdviceWho Called:  Patient Symptoms (please be specific):  How long has patient had these symptoms:  Pharmacy name and phone #:  Best Call Back Number: [phone] Information: Patient is requesting a call back to reschedule her appt. on 5/9.

## 2025-07-22 ENCOUNTER — TELEPHONE (OUTPATIENT)
Dept: HEMATOLOGY/ONCOLOGY | Facility: CLINIC | Age: 55
End: 2025-07-22
Payer: MEDICAID

## 2025-07-22 NOTE — TELEPHONE ENCOUNTER
Copied from CRM #[id number]. Topic: General Inquiry - Patient Advice  >> Jul 22, 2025  8:40 AM Daya wrote:  Type: Needs Medical Advice  Who Called:  pt   Symptoms (please be specific):    How long has patient had these symptoms:    Pharmacy name and phone #:    Best Call Back Number: [phone]  Additional Information: pt is requesting a call back to schedule an appt, say her iron is low

## 2025-07-23 ENCOUNTER — LAB VISIT (OUTPATIENT)
Dept: LAB | Facility: HOSPITAL | Age: 55
End: 2025-07-23
Payer: MEDICAID

## 2025-07-23 DIAGNOSIS — E53.8 B12 DEFICIENCY: ICD-10-CM

## 2025-07-23 DIAGNOSIS — D50.8 IRON DEFICIENCY ANEMIA SECONDARY TO INADEQUATE DIETARY IRON INTAKE: ICD-10-CM

## 2025-07-23 LAB
ABSOLUTE EOSINOPHIL (OHS): 0.35 K/UL
ABSOLUTE MONOCYTE (OHS): 0.61 K/UL (ref 0.3–1)
ABSOLUTE NEUTROPHIL COUNT (OHS): 4.57 K/UL (ref 1.8–7.7)
ALBUMIN SERPL BCP-MCNC: 3.7 G/DL (ref 3.5–5.2)
ALP SERPL-CCNC: 142 UNIT/L (ref 40–150)
ALT SERPL W/O P-5'-P-CCNC: 21 UNIT/L (ref 10–44)
ANION GAP (OHS): 10 MMOL/L (ref 8–16)
AST SERPL-CCNC: 14 UNIT/L (ref 11–45)
BASOPHILS # BLD AUTO: 0.11 K/UL
BASOPHILS NFR BLD AUTO: 1.3 %
BILIRUB SERPL-MCNC: 0.2 MG/DL (ref 0.1–1)
BUN SERPL-MCNC: 13 MG/DL (ref 6–20)
CALCIUM SERPL-MCNC: 8.9 MG/DL (ref 8.7–10.5)
CHLORIDE SERPL-SCNC: 110 MMOL/L (ref 95–110)
CO2 SERPL-SCNC: 20 MMOL/L (ref 23–29)
CREAT SERPL-MCNC: 0.8 MG/DL (ref 0.5–1.4)
ERYTHROCYTE [DISTWIDTH] IN BLOOD BY AUTOMATED COUNT: 12.5 % (ref 11.5–14.5)
FERRITIN SERPL-MCNC: 142 NG/ML (ref 20–300)
FOLATE SERPL-MCNC: 3.4 NG/ML (ref 4–24)
GFR SERPLBLD CREATININE-BSD FMLA CKD-EPI: >60 ML/MIN/1.73/M2
GLUCOSE SERPL-MCNC: 99 MG/DL (ref 70–110)
HCT VFR BLD AUTO: 31.5 % (ref 37–48.5)
HGB BLD-MCNC: 10.5 GM/DL (ref 12–16)
IMM GRANULOCYTES # BLD AUTO: 0.07 K/UL (ref 0–0.04)
IMM GRANULOCYTES NFR BLD AUTO: 0.8 % (ref 0–0.5)
IRON SATN MFR SERPL: 18 % (ref 20–50)
IRON SERPL-MCNC: 55 UG/DL (ref 30–160)
LYMPHOCYTES # BLD AUTO: 2.61 K/UL (ref 1–4.8)
MCH RBC QN AUTO: 29.6 PG (ref 27–31)
MCHC RBC AUTO-ENTMCNC: 33.3 G/DL (ref 32–36)
MCV RBC AUTO: 89 FL (ref 82–98)
NUCLEATED RBC (/100WBC) (OHS): 0 /100 WBC
PLATELET # BLD AUTO: 407 K/UL (ref 150–450)
PMV BLD AUTO: 9.2 FL (ref 9.2–12.9)
POTASSIUM SERPL-SCNC: 3.8 MMOL/L (ref 3.5–5.1)
PROT SERPL-MCNC: 6.7 GM/DL (ref 6–8.4)
RBC # BLD AUTO: 3.55 M/UL (ref 4–5.4)
RELATIVE EOSINOPHIL (OHS): 4.2 %
RELATIVE LYMPHOCYTE (OHS): 31.4 % (ref 18–48)
RELATIVE MONOCYTE (OHS): 7.3 % (ref 4–15)
RELATIVE NEUTROPHIL (OHS): 55 % (ref 38–73)
SODIUM SERPL-SCNC: 140 MMOL/L (ref 136–145)
TIBC SERPL-MCNC: 305 UG/DL (ref 250–450)
TRANSFERRIN SERPL-MCNC: 206 MG/DL (ref 200–375)
VIT B12 SERPL-MCNC: 294 PG/ML (ref 210–950)
WBC # BLD AUTO: 8.32 K/UL (ref 3.9–12.7)

## 2025-07-23 PROCEDURE — 85025 COMPLETE CBC W/AUTO DIFF WBC: CPT | Mod: PO

## 2025-07-23 PROCEDURE — 82728 ASSAY OF FERRITIN: CPT

## 2025-07-23 PROCEDURE — 84075 ASSAY ALKALINE PHOSPHATASE: CPT | Mod: PN

## 2025-07-23 PROCEDURE — 82746 ASSAY OF FOLIC ACID SERUM: CPT

## 2025-07-23 PROCEDURE — 36415 COLL VENOUS BLD VENIPUNCTURE: CPT | Mod: PO

## 2025-07-23 PROCEDURE — 84466 ASSAY OF TRANSFERRIN: CPT

## 2025-07-23 PROCEDURE — 82607 VITAMIN B-12: CPT

## 2025-07-25 ENCOUNTER — TELEPHONE (OUTPATIENT)
Dept: HEMATOLOGY/ONCOLOGY | Facility: CLINIC | Age: 55
End: 2025-07-25
Payer: MEDICAID

## 2025-07-25 NOTE — TELEPHONE ENCOUNTER
Copied from CRM #[id number]. Topic: General Inquiry - Patient Advice  >> Jul 25, 2025  8:44 AM Daya wrote:  Type: Needs Medical Advice  Who Called:  pt   Symptoms (please be specific):    How long has patient had these symptoms:    Pharmacy name and phone #:    Best Call Back Number: [phone]  Additional Information: pt is requesting a call back to schedule an appt with JONES Perez

## 2025-07-25 NOTE — TELEPHONE ENCOUNTER
Patient rescheduled due to Jason being out. Patient requested to see Johnie sooner than Jason's next available. Rescheduled to 7/28 per patient request.

## 2025-07-28 ENCOUNTER — TELEPHONE (OUTPATIENT)
Dept: HEMATOLOGY/ONCOLOGY | Facility: CLINIC | Age: 55
End: 2025-07-28
Payer: MEDICAID

## 2025-07-28 NOTE — PROGRESS NOTES
PATIENT: Barbara Lozano  MRN: [id number]  DATE: 7/29/2025    The patient location is: [place]  The chief complaint leading to consultation is: Anemia follow up    Visit type: audiovisual    Face to Face time with patient: 6 minutes  15  minutes of total time spent on the encounter, which includes face to face time and non-face to face time preparing to see the patient (eg, review of tests), Obtaining and/or reviewing separately obtained history, Documenting clinical information in the electronic or other health record, Independently interpreting results (not separately reported) and communicating results to the patient/family/caregiver, or Care coordination (not separately reported).         Each patient to whom he or she provides medical services by telemedicine is:  (1) informed of the relationship between the physician and patient and the respective role of any other health care provider with respect to management of the patient; and (2) notified that he or she may decline to receive medical services by telemedicine and may withdraw from such care at any time.    Notes:             Diagnosis:   1. Iron deficiency anemia secondary to inadequate dietary iron intake    2. B12 deficiency    3. Folate deficiency          Chief Complaint: Follow up BEREKET          Subjective:    Interval History: Ms. Lozano is a 54 y.o. female who returns for follow up. She completed IV iron infusions 1/2025. She reports not taking Vit B12 for some time. Reports fatigue and sob with exertion.  She admits that she does not get restful sleep. Denies cp, palpitations,  diarrhea, abdominal pain, new bumps, lumps, bleeding, bruising.     Prior History:   Per Record:   03/2023 - 06/2023:  Venofer 300 mg x 4  11/15/24 - 01/02/25:  Ferrlecit 125 mg x 5/6     History of Present Illness:   HPI  Ms Lozano presents via telemed for eval & review of labs from previous IV iron infusions.  To note:  it has been 6 weeks from last iron infusion.  She  endorses some fatigue, remains dizzy @ times.  Reviewed time factor of 6 weeks post last infusion.  Labs reviewed.  No CP, SOB, Palpitations, bleeding, cold extremities, pica, HA's, blurred vision, et.  Discussed plan to re-evaluate in 8 weeks as it has only been 6 weeks since last infusion.  Patient will call for any worsening s/s of anemia prior to follow up.    Oncology History    No history exists.       Past Medical History:   Past Medical History:   Diagnosis Date    Acute gastric ulcer     chronic Constipation     Diaphragmatic hernia     Diarrhea     Diverticulum of esophagus     Dysphagia     Esophagitis, unspecified without bleeding     Gastric diverticulum     Gastritis     Gastroparesis     GERD (gastroesophageal reflux disease)     Hemorrhoids     Irritable bowel syndrome     Migraines     Osteoporosis        Past Surgical HIstory:   Past Surgical History:   Procedure Laterality Date    APPENDECTOMY      CHOLECYSTECTOMY      EGD, WITH BALLOON DILATION  12/15/2022    20mm    HYSTERECTOMY      robotic assisted near total gastrectomy  2022       Family History:   Family History   Problem Relation Name Age of Onset    Hypertension Mother      Diabetes Mother      Arthritis Mother      Heart disease Father          MI  ~ 45 yrs old    Other Maternal Grandmother          had colostomy bag ? how many yr. had when she     Esophageal cancer Neg Hx      Stomach cancer Neg Hx      Colon cancer Neg Hx      Colon polyps Neg Hx      Pancreatic cancer Neg Hx      Liver cancer Neg Hx      Celiac disease Neg Hx      Crohn's disease Neg Hx      Irritable bowel syndrome Neg Hx      Ulcerative colitis Neg Hx      Rectal cancer Neg Hx         Social History:  reports that she has never smoked. She has never used smokeless tobacco. She reports that she does not drink alcohol and does not use drugs.    Allergies:  Review of patient's allergies indicates:   Allergen Reactions    Adhesive tape-silicones  Blisters     Blisters -severe       Medications:  Current Medications[1]    Review of Systems   Constitutional:  Positive for fatigue. Negative for appetite change and unexpected weight change.   HENT:  Negative for mouth sores.    Eyes:  Negative for visual disturbance.   Respiratory:  Positive for shortness of breath (with exertion). Negative for cough.    Cardiovascular:  Negative for chest pain and palpitations.   Gastrointestinal:  Negative for abdominal pain, blood in stool and diarrhea.   Genitourinary:  Negative for frequency and hematuria.   Musculoskeletal:  Negative for back pain.   Skin:  Negative for rash.   Neurological:  Negative for headaches.   Hematological:  Negative for adenopathy.   Psychiatric/Behavioral:  The patient is not nervous/anxious.        ECOG Performance Status:   ECOG SCORE             Objective:      Vitals: There were no vitals filed for this visit.  BMI: There is no height or weight on file to calculate BMI.    Physical Exam    Laboratory Data:  No visits with results within 5 Day(s) from this visit.   Latest known visit with results is:   Lab Visit on 07/23/2025   Component Date Value Ref Range Status    Sodium 07/23/2025 140  136 - 145 mmol/L Final    Potassium 07/23/2025 3.8  3.5 - 5.1 mmol/L Final    Chloride 07/23/2025 110  95 - 110 mmol/L Final    CO2 07/23/2025 20 (L)  23 - 29 mmol/L Final    Glucose 07/23/2025 99  70 - 110 mg/dL Final    BUN 07/23/2025 13  6 - 20 mg/dL Final    Creatinine 07/23/2025 0.8  0.5 - 1.4 mg/dL Final    Calcium 07/23/2025 8.9  8.7 - 10.5 mg/dL Final    Protein Total 07/23/2025 6.7  6.0 - 8.4 gm/dL Final    Albumin 07/23/2025 3.7  3.5 - 5.2 g/dL Final    Bilirubin Total 07/23/2025 0.2  0.1 - 1.0 mg/dL Final    ALP 07/23/2025 142  40 - 150 unit/L Final    AST 07/23/2025 14  11 - 45 unit/L Final    ALT 07/23/2025 21  10 - 44 unit/L Final    Anion Gap 07/23/2025 10  8 - 16 mmol/L Final    eGFR 07/23/2025 >60  >60 mL/min/1.73/m2 Final    Ferritin  07/23/2025 142.0  20.0 - 300.0 ng/mL Final    Vitamin B12 07/23/2025 294  210 - 950 pg/mL Final    Iron Level 07/23/2025 55  30 - 160 ug/dL Final    Transferrin 07/23/2025 206  200 - 375 mg/dL Final    Iron Binding Capacity Total 07/23/2025 305  250 - 450 ug/dL Final    Iron Saturation 07/23/2025 18 (L)  20 - 50 % Final    Folate Level 07/23/2025 3.4 (L)  4.0 - 24.0 ng/mL Final    WBC 07/23/2025 8.32  3.90 - 12.70 K/uL Final    RBC 07/23/2025 3.55 (L)  4.00 - 5.40 M/uL Final    HGB 07/23/2025 10.5 (L)  12.0 - 16.0 gm/dL Final    HCT 07/23/2025 31.5 (L)  37.0 - 48.5 % Final    MCV 07/23/2025 89  82 - 98 fL Final    MCH 07/23/2025 29.6  27.0 - 31.0 pg Final    MCHC 07/23/2025 33.3  32.0 - 36.0 g/dL Final    RDW 07/23/2025 12.5  11.5 - 14.5 % Final    Platelet Count 07/23/2025 407  150 - 450 K/uL Final    MPV 07/23/2025 9.2  9.2 - 12.9 fL Final    Nucleated RBC 07/23/2025 0  <=0 /100 WBC Final    Neut % 07/23/2025 55.0  38 - 73 % Final    Lymph % 07/23/2025 31.4  18 - 48 % Final    Mono % 07/23/2025 7.3  4 - 15 % Final    Eos % 07/23/2025 4.2  <=8 % Final    Basophil % 07/23/2025 1.3  <=1.9 % Final    Imm Grans % 07/23/2025 0.8 (H)  0.0 - 0.5 % Final    Neut # 07/23/2025 4.57  1.8 - 7.7 K/uL Final    Lymph # 07/23/2025 2.61  1 - 4.8 K/uL Final    Mono # 07/23/2025 0.61  0.3 - 1 K/uL Final    Eos # 07/23/2025 0.35  <=0.5 K/uL Final    Baso # 07/23/2025 0.11  <=0.2 K/uL Final    Imm Grans # 07/23/2025 0.07 (H)  0.00 - 0.04 K/uL Final          Imaging: Reviewed   Assessment:       1. Iron deficiency anemia secondary to inadequate dietary iron intake    2. B12 deficiency    3. Folate deficiency             Plan:     Iron deficiency anemia due to malabsorption:  -I reviewed independently the patient laboratory and radiologic findings her [facility] also were reviewed.  I discussed her blood workup going back to 2021 with the patient was anemic with persistent leukocytosis and thrombocytosis.  Her  iron studies are favoring iron-deficiency anemia.  Her % saturation is low.  Even though she has been taking oral iron for the last 2 months most likely she is not able to absorb the oral iron due to her recent gastrectomy and her oral intake of PPI.    -The patient  received IV Venofer at a dose of 300 mg weekly for 4 doses.  Her Hgb improved and ferritin  went from 17 -291  -She will be seen again in 3 months with repeat CBC and ferrtin  - 10/12:  Hgb has remained @ 10.6; Ferritin has remained stable @ 255; B12 improved from 342 to 409  Will reassess in 3 months after evaluation from surgeon of abdomen & hemorrhoids with CBC, Iron studies/ferritin prior.  -01/29/24:  Stable, asymptomatic; improved labs; will reevaluate in 6 months with CBC, Iron studies/ferritin/Vitamin b12/folate prior.  Call for any worsening s/s of anemia prior.  07/31/24:  Symptomatic; last iron infusions 06/23; [place] deficit of 853 mg; Ferrlecit 125 mg weekly x 6 submitted for auth; f/u 4 vv with CBC, Iron studies/ferritin prior   02/13/25:  Remains with some symptoms of anemia; functional; only 6 weeks from last infusion; will recheck in 8 weeks with labs; patient to call if s/s of anemia worsen prior to f/u.  7/29/25:  Iron replete. Hgb stable. B12 <500. Folate 3.4  Prescription sent for folic acid. Pt notified that b12 refills are available at [facility].    Follow up in 8 weeks with repeat CBC, cmp, ferritin, Vitamin b12, folate prior.  Patient advised to call if s/s of anemia worsen prior to f/u.     Vitamin B12 deficiency :  -continue oral Vitamin B12 at a dose 1000 mcg daily.   -recheck levels in 6 months  07/31/24:  Noncompliant with oral B12; resume.  3/21/25: Not currently taking B12. Refills available at [facility].    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  -in view of her abdominal pain at the site  of the surgery the patient was advised to follow up with her surgeon.  Call for an earlier appt if symptoms worsen; have MD evaluate hemorrhoids.      Note:  Patient needs Benadryl & Pepcid premeds for s/e with iron infusions    Visit today included increased complexity associated with the care of the episodic problem  addressed and managing the longitudinal care of the patient due to the serious and/or complex managed problem(s) multi factorial anemia s/p gastrectomy.     Med Onc Chart Routing      Follow up with physician    Follow up with ELDA 2 months. with repeat cbc, cmp, ferritin, folate, b12 at least 1 day prior to vv   Infusion scheduling note    Injection scheduling note    Labs    Imaging    Pharmacy appointment    Other referrals                Plan was discussed with the patient at length, and she verbalized understanding. Barbara was given an opportunity to ask questions that were answered to her satisfaction, and she was advised to call in the interval if any problems or questions arise.    Assessment/Plan reviewed and approved by Dr Otero    15 minutes were spent in coordination of patient's care, record review and counseling.    OZIEL Christensen, FNP-C  Hematology & Oncology           [1]   Current Outpatient Medications   Medication Sig Dispense Refill    amLODIPine (NORVASC) 10 MG tablet Take 10 mg by mouth.      atorvastatin (LIPITOR) 10 MG tablet Take 10 mg by mouth.      cyanocobalamin, vitamin B-12, 1,000 mcg Cap Take 1,000 mcg by mouth once daily. 30 capsule 3    cyclobenzaprine (FLEXERIL) 10 MG tablet Take 10 mg by mouth 2 (two) times daily as needed.      dexlansoprazole (DEXILANT) 60 mg capsule Take 1 capsule by mouth every morning.      dicyclomine (BENTYL) 10 MG capsule Take 10 mg by mouth.      dicyclomine HCl (DICYCLOMINE ORAL) dicyclomine Take No date recorded No form recorded No frequency recorded No route recorded No set duration recorded No set duration amount recorded active No  dosage strength recorded No dosage strength units of measure recorded      esomeprazole (NEXIUM) 40 MG capsule Take 40 mg by mouth every morning.      FEROSUL 325 mg (65 mg iron) Tab tablet Take by mouth.      GIMOTI 15 mg/spray SprP 1 spray by Each Nostril route 4 (four) times daily.      hydrOXYzine pamoate (VISTARIL) 50 MG Cap hydroxyzine pamoate 50 mg capsule   TAKE 1 TO 2 CAPSULES BY MOUTH AT BEDTIME AS NEEDED FOR INSOMNIA      levocetirizine (XYZAL) 5 MG tablet Take 5 mg by mouth.      LINZESS 290 mcg Cap capsule Take 290 mcg by mouth every morning.      methocarbamoL (ROBAXIN) 500 MG Tab Take 500-1,000 mg by mouth.      metoclopramide HCl (REGLAN) 10 MG tablet Take 1 tablet (10 mg total) by mouth every 6 (six) hours as needed (Nausea). 30 tablet 0    MOTEGRITY 2 mg Tab Take 1 tablet by mouth.      OMEPRAZOLE ORAL omeprazole Take No date recorded No form recorded No frequency recorded No route recorded No set duration recorded No set duration amount recorded active No dosage strength recorded No dosage strength units of measure recorded      ondansetron (ZOFRAN) 4 MG tablet Take 1 tablet (4 mg total) by mouth every 8 (eight) hours as needed for Nausea. 12 tablet 0    ondansetron (ZOFRAN-ODT) 8 MG TbDL 8 mg daily as needed.      oxybutynin (DITROPAN-XL) 10 MG 24 hr tablet Take 10 mg by mouth.      pantoprazole (PROTONIX) 40 MG tablet Take 40 mg by mouth.      promethazine (PHENERGAN) 25 MG tablet Take 25 mg by mouth every 12 (twelve) hours.      rizatriptan (MAXALT) 5 MG tablet SMARTSI Tablet(s) By Mouth 1-2 Times Daily      rOPINIRole (REQUIP) 0.25 MG tablet Take 0.25 mg by mouth.      TRULANCE 3 mg Tab Take 1 tablet by mouth.       No current facility-administered medications for this visit.

## 2025-07-29 ENCOUNTER — OFFICE VISIT (OUTPATIENT)
Dept: HEMATOLOGY/ONCOLOGY | Facility: CLINIC | Age: 55
End: 2025-07-29
Payer: MEDICAID

## 2025-07-29 DIAGNOSIS — E53.8 B12 DEFICIENCY: ICD-10-CM

## 2025-07-29 DIAGNOSIS — E53.8 FOLATE DEFICIENCY: ICD-10-CM

## 2025-07-29 DIAGNOSIS — D50.8 IRON DEFICIENCY ANEMIA SECONDARY TO INADEQUATE DIETARY IRON INTAKE: Primary | ICD-10-CM

## 2025-07-29 PROCEDURE — 1159F MED LIST DOCD IN RCRD: CPT | Mod: CPTII,95,,

## 2025-07-29 PROCEDURE — 1160F RVW MEDS BY RX/DR IN RCRD: CPT | Mod: CPTII,95,,

## 2025-07-29 PROCEDURE — 98004 SYNCH AUDIO-VIDEO EST SF 10: CPT | Mod: 95,,,

## 2025-07-29 PROCEDURE — G2211 COMPLEX E/M VISIT ADD ON: HCPCS | Mod: 95,,,

## 2025-07-29 RX ORDER — FOLIC ACID 1 MG/1
1 TABLET ORAL DAILY
Qty: 30 TABLET | Refills: 11 | Status: SHIPPED | OUTPATIENT
Start: 2025-07-29 | End: 2026-07-29